# Patient Record
Sex: FEMALE | Race: WHITE | Employment: FULL TIME | URBAN - METROPOLITAN AREA
[De-identification: names, ages, dates, MRNs, and addresses within clinical notes are randomized per-mention and may not be internally consistent; named-entity substitution may affect disease eponyms.]

---

## 2017-06-28 ENCOUNTER — HOSPITAL ENCOUNTER (OUTPATIENT)
Dept: PHYSICAL THERAPY | Age: 62
Discharge: HOME | End: 2017-06-28
Payer: COMMERCIAL

## 2017-06-28 PROCEDURE — 97140 MANUAL THERAPY 1/> REGIONS: CPT

## 2017-06-28 PROCEDURE — 97110 THERAPEUTIC EXERCISES: CPT

## 2017-06-28 PROCEDURE — 97161 PT EVAL LOW COMPLEX 20 MIN: CPT

## 2017-06-28 NOTE — PROGRESS NOTES
[facility] PHYSICAL THERAPY  [address] [place] [address] 51, [facility] [[email or web address]] 201,[facility], 70 [facility] - Phone: [phone]  Fax: [phone] [facility] / [address] [facility]  Patient Name: Clem Mohan : 1955   Medical   Diagnosis: S/p B TKA Treatment Diagnosis: Bilateral knee pain [M25.561, M25.562]  Postoperative pain, acute, knee [M25.569, G89.18]   Onset Date: DOS: 17     Referral Source: Shiva Yuan MD [facility]): 2017   Prior Hospitalization: See medical history Provider #: [id number]   Prior Level of Function: Previously ambulated w/o AD   Comorbidities: depression   Medications: Verified on Patient Summary List   The Plan of Care and following information is based on the information from the initial evaluation.   ===========================================================================================  Assessment / key information:  Pt is a 64 y/o F who presents to PT w/ c/o B knee pain L>R s/p B TKA DOS 17. Pt reports 2 year hx of chronic knee pain that failed to improve w/ conservative measures. Pt reports receiving subacute rehab x 1 week f/b 1 week HHPT. Reports compliance to HEP since DC from PT. Pt c/o pain ranging 0-5/10 located primarily L medial/lateral knee and stiffness L>R knee. Pt notes difficulty ascending/descending stairs and rising to stand from low/soft surfaces. Pt denies red flags. Objective exam: Pt presents ambulating w/ FWW, widened GREY and dec heel strike/knee flexion L. R knee AROM 1-0-100, L knee AROM 3-0-85. PROM R knee flexion 103, L knee 91. Dec inf patella glide noted L. Fair quad set R, fair- L, w/ mild extensor lag noted to L SLR flex. Mild effusion noted to B knees w/ girth measurements as follows: mid pat R= 52 cm, L 51, 2\" above R = 54.4, L = 57, 4\" above R = 56, L = 59, 2\" below R = 45, L = 45, 6\" below R = 48, L = 46.5. TTP noted to L medial joint line.  (-) Chinyere's, (-) 90/90. Pt demo dec SLS ability B. FOTO score 33/100. Pt educated on dx, prognosis, POC, and HEP. Pt will benefit from PT to address problem list below to enable pt improved functional mobility  ===========================================================================================  Eval Complexity: History LOW Complexity : Zero comorbidities / personal factors that will impact the outcome / POC;  Examination  MEDIUM Complexity : 3 Standardized tests and measures addressing body structure, function, activity limitation and / or participation in recreation ; Presentation MEDIUM Complexity : Evolving with changing characteristics ; Decision Making MEDIUM Complexity : FOTO score of 26-74; Overall Complexity LOW   Problem List: pain affecting function, decrease ROM, decrease strength, edema affecting function, impaired gait/ balance, decrease ADL/ functional abilitiies, decrease activity tolerance, decrease flexibility/ joint mobility and decrease transfer abilities   Treatment Plan may include any combination of the following: Therapeutic exercise, Therapeutic activities, Neuromuscular re-education, Physical agent/modality, Gait/balance training, Manual therapy, Patient education, Self Care training, Functional mobility training, Home safety training and Stair training  Patient / Family readiness to learn indicated by: asking questions, trying to perform skills and interest  Persons(s) to be included in education: patient (P)  Barriers to Learning/Limitations: None  Measures taken:    Patient Goal (s): \"get back to walking\"   Patient self reported health status: good  Rehabilitation Potential: good   Short Term Goals: To be accomplished in  2  weeks:  1. Pt will be I and compliant with HEP for self management of sx  2. Improve B knee flexion AROM to at least 115 deg to improve ease of transfers and stair clearance   Long Term Goals: To be accomplished in  4  weeks:  1.  Pt will ambulate >500' w/o AD demonstrating symmetrical gait to allow pt return to PLOF  2. Pt will ascend/descend 4 steps w/ 1 HR, reciprocal pattern to A w/ household entry  3. Improve FOTO Score to >/= 56/100 to indicate improved function    Frequency / Duration:   Patient to be seen  3  times per week for 4  weeks:  Patient / Caregiver education and instruction: exercises  G-Codes (GP): na  Therapist Signature: Brett Hodges PT, DPT, CMTPT, [facility] Date: 9/66/3741   Certification Period: na Time: 10:43 AM   ===========================================================================================  I certify that the above Physical Therapy Services are being furnished while the patient is under my care. I agree with the treatment plan and certify that this therapy is necessary. Physician Signature:        Date:       Time:     Please sign and return to [facility] [place]. or you may fax the signed copy to [phone]. Thank you.

## 2017-06-28 NOTE — PROGRESS NOTES
PHYSICAL THERAPY - DAILY TREATMENT NOTE    Patient Name: Samira Hardy        Date: 2017  : 1955   yes Patient  Verified  Visit #:      12  Insurance: Payor: Carmen Severance / Plan: [employer] [facility] / Product Type: PPO /      In time: 9:52 Out time: 10:39   Total Treatment Time: 47     Medicare Time Tracking (below)   Total Timed Codes (min):  na 1:1 Treatment Time:  na     TREATMENT AREA =  Bilateral knee pain [M25.561, M25.562]  Postoperative pain, acute, knee [M25.569, G89.18]    SUBJECTIVE  Pain Level (on 0 to 10 scale):  0  / 10   Medication Changes/New allergies or changes in medical history, any new surgeries or procedures?    no  If yes, update Summary List   Subjective Functional Status/Changes:  []  No changes reported     See POC          OBJECTIVE    10 min Therapeutic Exercise:  [x]  See flow sheet   Rationale:      increase ROM and increase strength to improve the patients ability to ambulate, stand, transfer     8 min Manual Therapy: PROM to L knee flexion, inf patella mob   Rationale:      decrease pain and increase ROM to improve patient's ability to ambulate w/ symmetrical gait     min Patient Education:  yes  Reviewed HEP   []  Progressed/Changed HEP based on: Other Objective/Functional Measures:    Pt demo I w/ HEP     Post Treatment Pain Level (on 0 to 10) scale:   0  / 10     ASSESSMENT  Assessment/Changes in Function:     See POC     []  See Progress Note/Recertification   Patient will continue to benefit from skilled PT services to modify and progress therapeutic interventions, address functional mobility deficits, address ROM deficits, address strength deficits, analyze and address soft tissue restrictions, analyze and cue movement patterns, analyze and modify body mechanics/ergonomics, assess and modify postural abnormalities, address imbalance/dizziness and instruct in home and community integration to attain remaining goals.    Progress toward goals / Updated goals:    See POC     PLAN  []  Upgrade activities as tolerated yes Continue plan of care   []  Discharge due to :    []  Other:      Therapist: Rich Pandey PT    Date: 6/28/2017 Time: 10:40 AM     Future Appointments  Date Time Provider Ronaldo Cabrera   6/30/2017 8:30 AM Robert Figueroa, PTA [facility]   7/3/2017 11:00 AM Venia Major, PTA [facility]   7/5/2017 8:00 AM Robert Figueroa, PTA [facility]   7/10/2017 10:00 AM Robert Figueroa, PTA [facility]   7/12/2017 10:30 AM Robert Figueroa, [facility]   7/14/2017 8:30 AM Wayne Duran, PT [facility]   7/24/2017 6:00 PM Rich Pandey, PT [facility]   7/26/2017 5:30 PM Rich Pandey, PT [facility]   7/28/2017 3:30 PM Yadi Mckoy, PTA [facility]   7/31/2017 6:00 PM Rich Pandey, PT [facility]   8/2/2017 6:00 PM Rich Pandey, PT [facility]   8/4/2017 6:30 AM Robert Figueroa, PTA [facility]

## 2017-06-30 ENCOUNTER — HOSPITAL ENCOUNTER (OUTPATIENT)
Dept: PHYSICAL THERAPY | Age: 62
Discharge: HOME | End: 2017-06-30
Payer: COMMERCIAL

## 2017-06-30 PROCEDURE — 97140 MANUAL THERAPY 1/> REGIONS: CPT

## 2017-06-30 PROCEDURE — 97110 THERAPEUTIC EXERCISES: CPT

## 2017-06-30 NOTE — PROGRESS NOTES
PHYSICAL THERAPY - DAILY TREATMENT NOTE    Patient Name: Ian Hunter        Date: 2017  : 1955   YES Patient  Verified  Visit #:   2  of   12  Insurance: Payor: BLUE CROSS / Plan: [facility] [facility] / Product Type: PPO /      In time: 830 Out time: 920   Total Treatment Time: 50     Medicare Time Tracking (below)   Total Timed Codes (min):   1:1 Treatment Time:       TREATMENT AREA =   Bilateral knee pain [M25.561, M25.562]  Postoperative pain, acute, knee [M25.569, G89.18]    SUBJECTIVE  Pain Level (on 0 to 10 scale):  0  / 10   Medication Changes/New allergies or changes in medical history, any new surgeries or procedures? NO    If yes, update Summary List   Subjective Functional Status/Changes:  []  No changes reported     This has not been a very good morning. My left leg is really sore and achy          OBJECTIVE  Modalities Rationale:     decrease inflammation and decrease pain to improve patient's ability to perform functional mobility activities with decrease c/o symptoms.    min [] Estim, type/location:                                      []  att     []  unatt     []  w/US     []  w/ice    []  w/heat    min []  Mechanical Traction: type/lbs                   []  pro   []  sup   []  int   []  cont    []  before manual    []  after manual    min []  Ultrasound, settings/location:      min []  Iontophoresis w/ dexamethasone, location:                                               []  take home patch       []  in clinic    min []  Ice     []  Heat    location/position:     min []  Vasopneumatic Device, press/temp:     min []  Other:    [x] Skin assessment post-treatment (if applicable):    [x]  intact    [x]  redness- no adverse reaction     []redness - adverse reaction:        35 min Therapeutic Exercise:  [x]  See flow sheet   Rationale:      increase ROM and increase strength to improve the patients ability to perform functional mobility activities with decrease c/o symptoms. 15 min Manual Therapy: B knee PROM, patella mob, retrograde massage. Rationale:      decrease pain, increase ROM and increase tissue extensibility to improve patient's ability to perform functional mobility activities with decrease c/o symptoms. min Patient Education:  YES  Reviewed HEP   []  Progressed/Changed HEP based on: Other Objective/Functional Measures:    No change in functional measurements today. Patient declined ice and stated she would at home. Post Treatment Pain Level (on 0 to 10) scale:   0  / 10 \"sore\"     ASSESSMENT  Assessment/Changes in Function:     Overall good tolerance to all therapeutic interventions for first treatment session     []  See Progress Note/Recertification   Patient will continue to benefit from skilled PT services to modify and progress therapeutic interventions, address functional mobility deficits, address ROM deficits, address strength deficits, analyze and address soft tissue restrictions and analyze and cue movement patterns to attain remaining goals. Progress toward goals / Updated goals:    No change toward goals today.      PLAN  []  Upgrade activities as tolerated YES Continue plan of care   []  Discharge due to :    []  Other:      Therapist: ERIC Stern    Date: 6/30/2017 Time: 6:28 AM       Future Appointments  Date Time Provider Ronaldo Cabrera   6/30/2017 8:30 AM Moishe Bosworth, [facility]   7/3/2017 11:00 AM Wiliam Cheema, PTA [facility]   7/5/2017 8:00 AM Moishe Bosworth, [facility]   7/10/2017 10:00 AM Moishe Bosworth, [facility]   7/12/2017 10:30 AM Moishe Bosworth, [facility]   7/14/2017 8:30 AM Leon Duran, PT [facility]   7/24/2017 6:00 PM Dewart Night, PT [facility]   7/26/2017 5:30 PM Dewart Night, PT [facility]   7/28/2017 3:30 PM Wiliam Cheema, PTA [facility]   7/31/2017 6:00 PM Dewart Night, PT [facility]   8/2/2017 6:00 PM Dewart Night, PT [facility]   8/4/2017 6:30 AM Nilay Mina Gill Gunner [facility]

## 2017-07-03 ENCOUNTER — HOSPITAL ENCOUNTER (OUTPATIENT)
Dept: PHYSICAL THERAPY | Age: 62
Discharge: HOME | End: 2017-07-03
Payer: COMMERCIAL

## 2017-07-03 PROCEDURE — 97110 THERAPEUTIC EXERCISES: CPT

## 2017-07-03 PROCEDURE — 97140 MANUAL THERAPY 1/> REGIONS: CPT

## 2017-07-03 NOTE — PROGRESS NOTES
PHYSICAL THERAPY - DAILY TREATMENT NOTE    Patient Name: Shira Pepe        Date: 7/3/2017  : 1955   YES Patient  Verified  Visit #:   3   of   12  Insurance: Payor: George Wilson / Plan: [employer] [facility] / Product Type: PPO /      In time: 1055 Out time: 1145   Total Treatment Time: 50     Medicare Time Tracking (below)   Total Timed Codes (min):  50 1:1 Treatment Time:       TREATMENT AREA =  Bilateral knee pain [M25.561, M25.562]  Postoperative pain, acute, knee [M25.569, G89.18]    SUBJECTIVE  Pain Level (on 0 to 10 scale):  0  / 10   Medication Changes/New allergies or changes in medical history, any new surgeries or procedures? NO    If yes, update Summary List   Subjective Functional Status/Changes:  []  No changes reported     \"I was pretty sore after the last visit, all around both knees. But it felt good to move and exercise. \"  States she's taking progressively longer walks out of the house, unable to determine distance. OBJECTIVE  40 min Therapeutic Exercise:  [x]  See flow sheet   Rationale:      increase ROM, increase strength, improve coordination and improve balance to improve the patients ability to ambulate. 10 Min Manual Therapy: (B) knee PROM and patellar mobs. Rationale:      decrease pain, increase ROM, increase tissue extensibility and decrease trigger points to improve patient's ability to perform pain free ADLs. min Patient Education:  YES  Reviewed HEP   []  Progressed/Changed HEP based on: Other Objective/Functional Measures: Therex per flow sheet. Post Treatment Pain Level (on 0 to 10) scale:   0  / 10     ASSESSMENT  Assessment/Changes in Function:     Pt reporting decreased pain following exercise. States the knees feel really good. Instructed to continue with HEP exercises.       []  See Progress Note/Recertification   Patient will continue to benefit from skilled PT services to modify and progress therapeutic interventions, address functional mobility deficits, address ROM deficits, address strength deficits, analyze and address soft tissue restrictions, analyze and cue movement patterns, analyze and modify body mechanics/ergonomics and assess and modify postural abnormalities to attain remaining goals. Progress toward goals / Updated goals:    Continued strengthening to progress toward LTG #1 and 2.       PLAN  [x]  Upgrade activities as tolerated YES Continue plan of care   []  Discharge due to :    []  Other:      Therapist: Stephanie Alex PTA    Date: 7/3/2017 Time: 12:33 PM     Future Appointments  Date Time Provider Ronaldo Cabrera   7/5/2017 8:00 AM Rafael Camera, PTA [facility]   7/10/2017 10:00 AM [place] Camera, [facility]   7/12/2017 10:30 AM [place] Camera, [facility]   7/14/2017 8:30 AM [address] [facility], [facility]   7/24/2017 6:00 PM [address] [facility], PT [facility]   7/26/2017 5:30 PM [address] [facility], PT [facility]   7/28/2017 3:30 PM Stephanie Alex, [facility]   7/31/2017 6:00 PM [address] [facility], PT [facility]   8/2/2017 6:00 PM [address] [facility], [facility]   8/4/2017 6:30 AM [place] Camera, [facility]

## 2017-07-05 ENCOUNTER — HOSPITAL ENCOUNTER (OUTPATIENT)
Dept: PHYSICAL THERAPY | Age: 62
Discharge: HOME | End: 2017-07-05
Payer: COMMERCIAL

## 2017-07-05 PROCEDURE — 97110 THERAPEUTIC EXERCISES: CPT

## 2017-07-05 PROCEDURE — 97140 MANUAL THERAPY 1/> REGIONS: CPT

## 2017-07-05 NOTE — PROGRESS NOTES
PHYSICAL THERAPY - DAILY TREATMENT NOTE    Patient Name: Taz Kaplan        Date: 2017  : 1955   YES Patient  Verified  Visit #:      of   12  Insurance: Payor: BLUE CROSS / [facility] / Product Type: PPO /      In time: 800 Out time: 845   Total Treatment Time: 45     Medicare Time Tracking (below)   Total Timed Codes (min):   1:1 Treatment Time:       TREATMENT AREA =  Bilateral knee pain [M25.561, M25.562]  Postoperative pain, acute, knee [M25.569, G89.18]    SUBJECTIVE  Pain Level (on 0 to 10 scale):  1  / 10   Medication Changes/New allergies or changes in medical history, any new surgeries or procedures? NO    If yes, update Summary List   Subjective Functional Status/Changes:  []  No changes reported     Just stiff, no real pain,  Reports no problems over the weekend. I hurt after last session, but it wasn't that bad. OBJECTIVE    35 min Therapeutic Exercise:  [x]  See flow sheet   Rationale:      increase ROM and increase strength to improve the patients ability to perform functional mobility activities with decrease c/o symptoms.       10 min Manual Therapy: (B) knee PROM and patellar mobs. Rationale:      decrease pain, increase ROM and increase tissue extensibility to improve patient's ability to perform functional mobility activities with decrease c/o symptoms.        min Patient Education:  YES  Reviewed HEP   []  Progressed/Changed HEP based on: Other Objective/Functional Measures:    PROM: R 0 - 110, L 0 - 103. Post Treatment Pain Level (on 0 to 10) scale:   0-1  / 10     ASSESSMENT  Assessment/Changes in Function:     Reports increasing ambulation (500 feet) outside with use of RW and someone by her side. reprots decrease difficulty with sit<>stand transfers.      []  See Progress Note/Recertification   Patient will continue to benefit from skilled PT services to modify and progress therapeutic interventions, address functional mobility deficits, address ROM deficits, address strength deficits, analyze and address soft tissue restrictions and analyze and cue movement patterns to attain remaining goals. Progress toward goals / Updated goals:    Progressing steadily with ROM  STG 1 achieved.      PLAN  []  Upgrade activities as tolerated YES Continue plan of care   []  Discharge due to :    []  Other:      Therapist: ERIC David    Date: 7/5/2017 Time: 6:45 AM       Future Appointments  Date Time Provider Ronaldo Cabrera   7/5/2017 8:00 AM Guanako Becker, PTA [facility]   7/10/2017 10:00 AM Guanako Becker, PTA [facility]   7/12/2017 10:30 AM Guanako Becker, PTA [facility]   7/14/2017 8:30 AM Farnaz Bae, PT [facility]   7/24/2017 6:00 PM Farnaz Bae, PT [facility]   7/26/2017 5:30 PM Farnaz Bae, PT [facility]   7/28/2017 3:30 PM Chris Rubin, PTA [facility]   7/31/2017 6:00 PM Farnaz Bae, PT [facility]   8/2/2017 6:00 PM Farnaz Bae, PT [facility]   8/4/2017 6:30 AM Guanako Becker, [facility]

## 2017-07-10 ENCOUNTER — HOSPITAL ENCOUNTER (OUTPATIENT)
Dept: PHYSICAL THERAPY | Age: 62
Discharge: HOME | End: 2017-07-10
Payer: COMMERCIAL

## 2017-07-10 PROCEDURE — 97110 THERAPEUTIC EXERCISES: CPT

## 2017-07-10 PROCEDURE — 97140 MANUAL THERAPY 1/> REGIONS: CPT

## 2017-07-10 NOTE — PROGRESS NOTES
PHYSICAL THERAPY - DAILY TREATMENT NOTE    Patient Name: Lalito Oshea        Date: 7/10/2017  : 1955   YES Patient  Verified  Visit #:      of   12  Insurance: Payor: [place] [employer] / Plan: [employer] [facility] / Product Type: PPO /      In time: 955 Out time: 1100   Total Treatment Time: 65     Medicare Time Tracking (below)   Total Timed Codes (min):   1:1 Treatment Time:       TREATMENT AREA =  Bilateral knee pain [M25.561, M25.562]  Postoperative pain, acute, knee [M25.569, G89.18]    SUBJECTIVE  Pain Level (on 0 to 10 scale):  0  / 10 (B). Medication Changes/New allergies or changes in medical history, any new surgeries or procedures? NO    If yes, update Summary List   Subjective Functional Status/Changes:  []  No changes reported     Just really stiff and sore today,  May have overdone it a bit over the weekend, and with the exercises too. OBJECTIVE    50 min Therapeutic Exercise:  [x]  See flow sheet   Rationale:      increase ROM and increase strength to improve the patients ability to perform functional mobility activities with decrease c/o symptoms. 15 min Manual Therapy: (B) knee PROM and patellar mobs. Rationale:      decrease pain, increase ROM and increase tissue extensibility to improve patient's ability to perform functional mobility activities with decrease c/o symptoms. min Patient Education:  YES  Reviewed HEP   []  Progressed/Changed HEP based on: Other Objective/Functional Measures:    GROC: +6 a great deal better  FOTO 45    Add standing hip abd, marching, HR/TR 3# 15x.      Post Treatment Pain Level (on 0 to 10) scale:   0  / 10     ASSESSMENT  Assessment/Changes in Function:     Able to negotiate 4 steps with (B) handrail and reciprocal pattern.     []  See Progress Note/Recertification   Patient will continue to benefit from skilled PT services to modify and progress therapeutic interventions, address functional mobility deficits, address ROM deficits, address strength deficits, analyze and address soft tissue restrictions and analyze and cue movement patterns to attain remaining goals. Progress toward goals / Updated goals:    See PN.      PLAN  []  Upgrade activities as tolerated YES Continue plan of care   []  Discharge due to :    []  Other:      Therapist: ERIC Rosa    Date: 7/10/2017 Time: 6:47 AM       Future Appointments  Date Time Provider Ronaldo Cabrera   7/10/2017 10:00 AM Redmon Apgar, [place] [address]   2017 10:30 AM Redmon Apgar, PTA [place] [address]   2017 8:30 AM [address] [facility], [place] [address]   2017 6:00 PM [address] [place] [address], [place] [address]   2017 5:30 PM [address], [place] [address]   2017 3:30 PM Angi Rubio, [place] [address]   2017 6:00 PM [address], PT [place] [address]   2017 6:00 PM [address] [place] [address], [place] [address]   2017 6:30 AM Redmon Apgar, [place] [address]

## 2017-07-10 NOTE — PROGRESS NOTES
[facility] PHYSICAL THERAPY  [address] [place] [address] 51, [address],[facility], [address] [facility] - Phone: [phone]  Fax: [phone]   PROGRESS NOTE    Patient Name: Chidi Ryan : 1955   Treatment/Medical  Diagnosis: Bilateral knee pain [M25.561, M25.562]  Postoperative pain, acute, knee [M25.569, G89.18]     Referral Source: [facility], Not On File, MD     Date of Initial Visit: 2017 Attended Visits: 5 Missed Visits: 0     SUMMARY OF TREATMENT  Chidi Rayn has been seen at our clinic 2x/wk for a total of 5 visits. Pt treatment has consisted of aerobic warm-up with stationary bike, therapeutic exercise for knee ROM, LE strengthening, hip/core strengthening, and manual therapy (patella mobilization, AAROM, and hamstring stretch)    CURRENT STATUS  Pt has had a good tolerance to physical therapy treatment. Patient reports that she is able to perform all functional mobility activities with significantly less difficulty and less pain. GROC +6 a great deal better. PROM: (L) 0 - 103, (R) 0 - 110. Patient is able to negotiate 4 small hurdles in parallel bars with fair clearance. SL balance 3 x 15 seconds with finger touch on bars. Patient reports compliance with HEP. Goal/Measure of Progress Goal Met? 1. Pt will ambulate >500' w/o AD demonstrating symmetrical gait to allow pt return to PLOF   Status at last Eval:  Current Status: Reports increasing ambulation (500 feet) outside with use of RW and someone by her side. Progressing well   2. Pt will ascend/descend 4 steps w/ 1 HR, reciprocal pattern to A w/ household entry   Status at last Eval:  Current Status: Negotiates 4 steps with (B) handrail and reciprocal pattern Progressing well. 3.  Improve FOTO Score to >/= 56/100 to indicate improved function   Status at last Eval: 33 Current Status: 39 Progressing steadily: 12 point improvement     New Goals to be achieved in __4__  weeks:  1.   Continue with above goals. RECOMMENDATIONS  We would like to continue with treatment to progress further with goals. We would like to extend 2-3x/week for 4 weeks. Please advise. Thank you. If you have any questions/comments please contact us directly at [phone]. Thank you for allowing us to assist in the care of your patient. LPTA Signature: Gigi Luna PTA Date: 7/10/2017   PT Signature:  Time: 9:51 AM     NOTE TO PHYSICIAN:  [address] AND FAX TO   [facility] Physical Therapy: [phone]  If you are unable to process this request in 24 hours please contact our office: [phone]    ___ I have read the above report and request that my patient continue as recommended.   ___ I have read the above report and request that my patient continue therapy with the following changes/special instructions:_________________________________________________________   ___ I have read the above report and request that my patient be discharged from therapy.      Physician Signature:        Date:       Time:

## 2017-07-12 ENCOUNTER — HOSPITAL ENCOUNTER (OUTPATIENT)
Dept: PHYSICAL THERAPY | Age: 62
Discharge: HOME | End: 2017-07-12
Payer: COMMERCIAL

## 2017-07-12 PROCEDURE — 97110 THERAPEUTIC EXERCISES: CPT

## 2017-07-12 PROCEDURE — 97140 MANUAL THERAPY 1/> REGIONS: CPT

## 2017-07-12 NOTE — PROGRESS NOTES
PHYSICAL THERAPY - DAILY TREATMENT NOTE    Patient Name: Moises Garcia        Date: 2017  : 1955   YES Patient  Verified  Visit #:     Insurance: Payor: BLUE CROSS / [facility]: [facility] / Product Type: PPO /      In time: 978 Out time:    Total Treatment Time: 60     Medicare Time Tracking (below)   Total Timed Codes (min):   1:1 Treatment Time:       TREATMENT AREA =  Bilateral knee pain [M25.561, M25.562]  Postoperative pain, acute, knee [M25.569, G89.18]    SUBJECTIVE  Pain Level (on 0 to 10 scale):  1  / 10   Medication Changes/New allergies or changes in medical history, any new surgeries or procedures? NO    If yes, update Summary List   Subjective Functional Status/Changes:  []  No changes reported     They were really happy with how my knees are coming along. I did really good with the drive up there, my brother drove and the sitting wasn't to bad. I have started taking my pain medication at night now  I though I was being a wimp so I had stopped, but they told me to start taking it. So I did and last night I slept really good. OBJECTIVE    40 min Therapeutic Exercise:  [x]  See flow sheet   Rationale:      increase ROM and increase strength to improve the patients ability to perform functional mobility activities with decrease c/o symptoms. 20 min Manual Therapy: (B) knee PROM and patellar mobs. Rationale:      decrease pain, increase ROM and increase tissue extensibility to improve patient's ability to perform functional mobility activities with decrease c/o symptoms. min Patient Education:  YES  Reviewed HEP   []  Progressed/Changed HEP based on: Other Objective/Functional Measures:    Good mobility of (B) incision sites. Decrease restriction of movement with PROM.      Post Treatment Pain Level (on 0 to 10) scale:   1  / 10     ASSESSMENT  Assessment/Changes in Function:     Reports increase ease of walking and less stiffness. Improved weight bearing (B) with sit <> stand activity     []  See Progress Note/Recertification   Patient will continue to benefit from skilled PT services to modify and progress therapeutic interventions, address functional mobility deficits, address ROM deficits, address strength deficits, analyze and address soft tissue restrictions and analyze and cue movement patterns to attain remaining goals. Progress toward goals / Updated goals:    Progressing steadily with ROM of (B) knee and strength.      PLAN  []  Upgrade activities as tolerated YES Continue plan of care   []  Discharge due to :    []  Other:      Therapist: ERIC Blackburn    Date: 7/12/2017 Time: 8:51 AM       Future Appointments  Date Time Provider Ronaldo Cabrera   7/12/2017 10:30 AM Shell Garcia PTA [facility]   7/14/2017 8:30 AM Jonelle Bull, PT [facility]   7/24/2017 6:00 PM Jonelle Bull PT [facility]   7/26/2017 5:30 PM Jonelle Bull PT [facility]   7/28/2017 3:30 PM Kinza Roberts PTA [facility]   7/31/2017 6:00 PM Jonelle Bull PT [facility]   8/2/2017 6:00 PM Jonelle Bull PT [facility]   8/4/2017 6:30 AM Shell Garcia PTA [facility]

## 2017-07-14 ENCOUNTER — HOSPITAL ENCOUNTER (OUTPATIENT)
Dept: PHYSICAL THERAPY | Age: 62
Discharge: HOME | End: 2017-07-14
Payer: COMMERCIAL

## 2017-07-14 PROCEDURE — 97110 THERAPEUTIC EXERCISES: CPT

## 2017-07-14 PROCEDURE — 97140 MANUAL THERAPY 1/> REGIONS: CPT

## 2017-07-14 NOTE — PROGRESS NOTES
PHYSICAL THERAPY - DAILY TREATMENT NOTE    Patient Name: Pierre Jaimes        Date: 2017  : 1955   yes Patient  Verified  Visit #:     Insurance: Payor: BLUE CROSS / [facility] / Product Type: PPO /      In time: 8:30 Out time: 9:25   Total Treatment Time: 55     Medicare Time Tracking (below)   Total Timed Codes (min):  na 1:1 Treatment Time:  na     TREATMENT AREA =  Bilateral knee pain [M25.561, M25.562]  Postoperative pain, acute, knee [M25.569, G89.18]    SUBJECTIVE  Pain Level (on 0 to 10 scale):  0  / 10   Medication Changes/New allergies or changes in medical history, any new surgeries or procedures?    no  If yes, update Summary List   Subjective Functional Status/Changes:  []  No changes reported     Pt c/o stiffness B knees this AM, otherwise notes no pain           OBJECTIVE    40 min Therapeutic Exercise:  [x]  See flow sheet   Rationale:      increase ROM, increase strength, improve coordination and improve balance to improve the patients ability to ambulate, transfer     15 min Manual Therapy: CFM to B incision, PROM B knee flexion   Rationale:      decrease pain, increase ROM, increase tissue extensibility and decrease trigger points to improve patient's ability to ambulate     min Patient Education:  yes  Reviewed HEP   []  Progressed/Changed HEP based on: Other Objective/Functional Measures:    Ambulated 50' x 2 w/ SPC R  Negotiated 6 small hurdles outside of parallel bars w/ SPC, CG A cueing to reduce knee circumduction  Pt unable to perform SLR flex w/ 1# weight w/o extensor lag, therefore reduced to 0#  Added DL leg press to improve quad strength     Post Treatment Pain Level (on 0 to 10) scale:   0  / 10     ASSESSMENT  Assessment/Changes in Function:     Pt progressing well towards all goals; demo's proper sequencing and symmetrical gait ambulating w/ SPC.  Advised pt to continue to amb w/ FWW outside of PT and will clear to [facility]/ [facility] when appropriate     []  See Progress Note/Recertification   Patient will continue to benefit from skilled PT services to modify and progress therapeutic interventions, address functional mobility deficits, address ROM deficits, address strength deficits, analyze and address soft tissue restrictions, analyze and cue movement patterns, analyze and modify body mechanics/ergonomics, address imbalance/dizziness and instruct in home and community integration to attain remaining goals.    Progress toward goals / Updated goals:    Progressing to LTG #1 this visit     PLAN  []  Upgrade activities as tolerated yes Continue plan of care   []  Discharge due to :    []  Other:      Therapist: Kaila Fox PT    Date: 7/14/2017 Time: 8:29 AM     Future Appointments  Date Time Provider Ronaldo Cabrera   7/14/2017 8:30 AM Kaila Fox PT [facility]   7/24/2017 6:00 PM Kaila Fox, PT [facility]   7/26/2017 5:30 PM Kaila Fox, PT [facility]   7/28/2017 3:30 PM Jayden Alva [facility]   7/31/2017 6:00 PM Kaila Fox, PT [facility]   8/2/2017 6:00 PM Kaila Fox, PT [facility]   8/4/2017 6:30 AM Andrei Huang [facility]

## 2017-07-24 ENCOUNTER — HOSPITAL ENCOUNTER (OUTPATIENT)
Dept: PHYSICAL THERAPY | Age: 62
Discharge: HOME | End: 2017-07-24
Payer: COMMERCIAL

## 2017-07-24 PROCEDURE — 97140 MANUAL THERAPY 1/> REGIONS: CPT

## 2017-07-24 PROCEDURE — 97110 THERAPEUTIC EXERCISES: CPT

## 2017-07-24 NOTE — PROGRESS NOTES
PHYSICAL THERAPY - DAILY TREATMENT NOTE    Patient Name: Stacy Wright        Date: 2017  : 1955   yes Patient  Verified  Visit #:   8   of   20  Insurance: Payor: BLUE CROSS / Plan: [employer] [facility] / Product Type: PPO /      In time: 6:00 Out time: 6:43   Total Treatment Time: 43     Medicare Time Tracking (below)   Total Timed Codes (min):  na 1:1 Treatment Time:  na     TREATMENT AREA =  Bilateral knee pain [M25.561, M25.562]  Postoperative pain, acute, knee [M25.569, G89.18]    SUBJECTIVE  Pain Level (on 0 to 10 scale):  1  / 10   Medication Changes/New allergies or changes in medical history, any new surgeries or procedures?    no  If yes, update Summary List   Subjective Functional Status/Changes:  []  No changes reported     Pt reports she was returned to work today and was able to drive without difficulty. Reports she sat in a higher chair but was mindful to get up and walk around 2-3x/hour to strech. C/o stiffness B knees upon arrival to PT       OBJECTIVE    31 min Therapeutic Exercise:  [x]  See flow sheet   Rationale:      increase ROM, increase strength, improve coordination and improve balance to improve the patients ability to ambulate     12 min Manual Therapy: CFM to B incision, inf patella mob B, PROM knee flexion   Rationale:      decrease pain, increase ROM, increase tissue extensibility and decrease trigger points to improve patient's ability to ambulate, stand     min Patient Education:  yes  Reviewed HEP   []  Progressed/Changed HEP based on: Other Objective/Functional Measures:    Pt brought in [facility] that she purchased; adjusted for appropriate fit.  Pt demo proper sequencing, jerica, and stability/balance w/ SPC  Inc resistance DL LP this visit       Post Treatment Pain Level (on 0 to 10) scale:   0  / 10     ASSESSMENT  Assessment/Changes in Function:     Cleared pt to amb w/ SPC for short community distances w/ SPC; advised to cont to utilize FWW if she is feeling unsteady, pain, or for instances where she may be standing/walking > 1 hour. []  See Progress Note/Recertification   Patient will continue to benefit from skilled PT services to modify and progress therapeutic interventions, address functional mobility deficits, address ROM deficits, address strength deficits, analyze and address soft tissue restrictions, analyze and cue movement patterns, address imbalance/dizziness and instruct in home and community integration to attain remaining goals.    Progress toward goals / Updated goals:    Progressing to LTG #2, negotiated 4 small steps w/ 1 HR w/ NR pattern     PLAN  []  Upgrade activities as tolerated yes Continue plan of care   []  Discharge due to :    []  Other:      Therapist: Burnell Cowden, PT    Date: 7/24/2017 Time: 2:30 PM     Future Appointments  Date Time Provider Ronaldo Cabrera   7/24/2017 6:00 PM Burnell Cowden, PT [facility]   7/26/2017 5:30 PM [address] [facility]   7/31/2017 6:00 PM [address] [facility]   8/2/2017 6:00 PM [address] [facility]   8/7/2017 5:00 PM Lozano Never, PTA [facility]   8/9/2017 5:00 PM Lozano Never, PTA [facility]   8/11/2017 8:30 AM Burnell Cowden, PT [facility]   8/14/2017 5:00 PM Lozano Never, PTA [facility]   8/16/2017 5:00 PM Lozano Never, PTA [facility]   8/18/2017 8:30 AM Burnell Cowden, PT [facility]   8/21/2017 5:00 PM Lozano Never, PTA [facility]   8/23/2017 5:00 PM Lozano Never, [facility]   8/25/2017 8:30 AM Burnell Cowden, PT [facility]   8/28/2017 5:00 PM Lozano Never, [facility]   8/30/2017 5:00 PM Lozano Never, PTA [facility]   9/1/2017 8:30 AM Gigi Luna, OCTAVIA [facility]

## 2017-07-26 ENCOUNTER — HOSPITAL ENCOUNTER (OUTPATIENT)
Dept: PHYSICAL THERAPY | Age: 62
Discharge: HOME | End: 2017-07-26
Payer: COMMERCIAL

## 2017-07-26 PROCEDURE — 97140 MANUAL THERAPY 1/> REGIONS: CPT

## 2017-07-26 PROCEDURE — 97110 THERAPEUTIC EXERCISES: CPT

## 2017-07-26 NOTE — PROGRESS NOTES
PHYSICAL THERAPY - DAILY TREATMENT NOTE    Patient Name: Bandar Godoy        Date: 2017  : 1955   yes Patient  Verified  Visit #:     Insurance: Payor: BLUE CROSS / Plan: [employer] [facility] / Product Type: PPO /      In time: 5:05 Out time: 5:50   Total Treatment Time: 45     Medicare Time Tracking (below)   Total Timed Codes (min):  na 1:1 Treatment Time:  na     TREATMENT AREA =  Bilateral knee pain [M25.561, M25.562]  Postoperative pain, acute, knee [M25.569, G89.18]    SUBJECTIVE  Pain Level (on 0 to 10 scale):  1  / 10   Medication Changes/New allergies or changes in medical history, any new surgeries or procedures?    no  If yes, update Summary List   Subjective Functional Status/Changes:  []  No changes reported     Pt reports she may have over-done it going back to work on Monday and working full days. Reports her legs feel tired and she noticed inc swelling in her ankles today. Reports she elevated her legs and swelling dec. OBJECTIVE    35 min Therapeutic Exercise:  [x]  See flow sheet   Rationale:      increase ROM and increase strength to improve the patients ability to ambulate, stand, transfer     10 min Manual Therapy: CFM to B incision, inf patella mob, PROM knee flexion   Rationale:      decrease pain, increase ROM, increase tissue extensibility and decrease trigger points to improve patient's ability to ambulate, stand     min Patient Education:  yes  Reviewed HEP   []  Progressed/Changed HEP based on:        Other Objective/Functional Measures:    Dec knee flexion PROM noted this visit 2' stiffness  Added supine bridges to improve glute and core strength     Post Treatment Pain Level (on 0 to 10) scale:   1  / 10     ASSESSMENT  Assessment/Changes in Function:     Pt requiring inc effort to complete therex this visit, though denied inc in pain     []  See Progress Note/Recertification   Patient will continue to benefit from skilled PT services to modify and progress therapeutic interventions, address functional mobility deficits, address ROM deficits, address strength deficits, analyze and address soft tissue restrictions, analyze and cue movement patterns, analyze and modify body mechanics/ergonomics, assess and modify postural abnormalities, address imbalance/dizziness and instruct in home and community integration to attain remaining goals.    Progress toward goals / Updated goals:    Progressing steadily to LTG #1, ambulating w/ SPC for short community distances     PLAN  []  Upgrade activities as tolerated yes Continue plan of care   []  Discharge due to :    []  Other:      Therapist: Alicia Amador PT    Date: 7/26/2017 Time: 5:35 PM     Future Appointments  Date Time Provider Ronaldo Cabrera   7/31/2017 6:00 PM Alicia Amador, PT [facility]   8/2/2017 6:00 PM Alicia Amador, PT [facility]   8/7/2017 5:00 PM Yoly Plata, PTA [facility]   8/9/2017 5:00 PM Yoly Plata PTA [facility]   8/11/2017 8:30 AM Alicia Amador, PT [facility]   8/14/2017 5:00 PM Yoly Plata, [facility]   8/16/2017 5:00 PM Yoly Plata, [facility]   8/18/2017 8:30 AM Alicia Amador, PT [facility]   8/21/2017 5:00 PM Yoly Plata, PTA [facility]   8/23/2017 5:00 PM Yoly Plata, PTA [facility]   8/25/2017 8:30 AM Alicia Amador, PT [facility]   8/28/2017 5:00 PM Yoly Plata [facility]   8/30/2017 5:00 PM Yoly Plata, [facility]   9/1/2017 8:30 AM Priscilla Eddy, [facility]

## 2017-07-28 ENCOUNTER — APPOINTMENT (OUTPATIENT)
Dept: PHYSICAL THERAPY | Age: 62
End: 2017-07-28
Payer: COMMERCIAL

## 2017-07-31 ENCOUNTER — HOSPITAL ENCOUNTER (OUTPATIENT)
Dept: PHYSICAL THERAPY | Age: 62
Discharge: HOME | End: 2017-07-31
Payer: COMMERCIAL

## 2017-07-31 PROCEDURE — 97140 MANUAL THERAPY 1/> REGIONS: CPT

## 2017-07-31 PROCEDURE — 97110 THERAPEUTIC EXERCISES: CPT

## 2017-07-31 NOTE — PROGRESS NOTES
PHYSICAL THERAPY - DAILY TREATMENT NOTE    Patient Name: Maryuri Manzo        Date: 2017  : 1955   yes Patient  Verified  Visit #:   10   of   20  Insurance: Payor: Jeni Jauregui / Plan: [employer] [facility] / Product Type: PPO /      In time: 5:55 Out time: 6:45   Total Treatment Time: 50     Medicare Time Tracking (below)   Total Timed Codes (min):  na 1:1 Treatment Time:  na     TREATMENT AREA =  Bilateral knee pain [M25.561, M25.562]  Postoperative pain, acute, knee [M25.569, G89.18]    SUBJECTIVE  Pain Level (on 0 to 10 scale):  2  / 10   Medication Changes/New allergies or changes in medical history, any new surgeries or procedures?    no  If yes, update Summary List   Subjective Functional Status/Changes:  []  No changes reported     Pt reports inc soreness today but is unsure what. Reports she sat too long on Thursday at work, causing inc swelling and pain to B knees. Reports she took off of work on Friday and rested/iced her legs. Reports current pain may be residual from that. Pt reports she got up every 30 minutes and walked around at work today, and has been elevated her legs at work as well         OBJECTIVE  35 min Therapeutic Exercise:  [x]  See flow sheet   Rationale:      increase ROM and increase strength to improve the patients ability to ambulate, stand, transfer     15 min Manual Therapy: CFM to B incision, inf patella mob, retro mass to R knee, PROM B knee flexion   Rationale:      decrease pain, increase ROM, increase tissue extensibility and decrease trigger points to improve patient's ability to complete ADLs     min Patient Education:  yes  Reviewed HEP   []  Progressed/Changed HEP based on:        Other Objective/Functional Measures:    Pt demo improved balance NR pattern small aye negotiation w/ SPC  Noted inc edema to R superolateral knee  Improved scar mobility noted B     Post Treatment Pain Level (on 0 to 10) scale:   0  / 10     ASSESSMENT  Assessment/Changes in Function:     Pt reported dec pain post tx session. Cont to demo guarding to L knee flexion PROM     []  See Progress Note/Recertification   Patient will continue to benefit from skilled PT services to modify and progress therapeutic interventions, address functional mobility deficits, address ROM deficits, address strength deficits, analyze and address soft tissue restrictions, analyze and cue movement patterns, analyze and modify body mechanics/ergonomics, address imbalance/dizziness and instruct in home and community integration to attain remaining goals.    Progress toward goals / Updated goals:    Met LTG #1, pt using SPC for all distances     PLAN  []  Upgrade activities as tolerated yes Continue plan of care   []  Discharge due to :    []  Other:      Therapist: Brett Hodges PT    Date: 7/31/2017 Time: 6:14 PM     Future Appointments  Date Time Provider Ronaldo Cabrera   8/2/2017 6:00 PM Brett Hodges PT [facility]   8/7/2017 5:00 PM Shell Nielson, PTA [facility]   8/9/2017 5:00 PM Shell Nielson, PTA [facility]   8/11/2017 8:30 AM Brett Hodges, PT [facility]   8/14/2017 5:00 PM Shell Nielson, [facility]   8/16/2017 5:00 PM Shell Nielson, PTA [facility]   8/18/2017 8:30 AM Brett Hodges PT [facility]   8/21/2017 5:00 PM Shell Nielson, PTA [facility]   8/23/2017 5:00 PM Shell Nielson, PTA [facility]   8/25/2017 8:30 AM Brett Hodges, PT [facility]   8/28/2017 5:00 PM Shell Nielson, [facility]   8/30/2017 5:00 PM Shell Nielson, PTA [facility]   9/1/2017 8:30 AM Kash Hewitt, [facility]

## 2017-08-02 ENCOUNTER — HOSPITAL ENCOUNTER (OUTPATIENT)
Dept: PHYSICAL THERAPY | Age: 62
Discharge: HOME | End: 2017-08-02
Payer: COMMERCIAL

## 2017-08-02 PROCEDURE — 97140 MANUAL THERAPY 1/> REGIONS: CPT

## 2017-08-02 PROCEDURE — 97110 THERAPEUTIC EXERCISES: CPT

## 2017-08-02 NOTE — PROGRESS NOTES
PHYSICAL THERAPY - DAILY TREATMENT NOTE    Patient Name: Taz Kaplan        Date: 2017  : 1955   yes Patient  Verified  Visit #:      20  Insurance: Payor: BLUE CROSS / Plan: [employer] [facility] [place] / Product Type: PPO /      In time: 5:55 Out time: 6:44   Total Treatment Time: 49     Medicare Time Tracking (below)   Total Timed Codes (min):  na 1:1 Treatment Time:  na     TREATMENT AREA =  Bilateral knee pain [M25.561, M25.562]  Postoperative pain, acute, knee [M25.569, G89.18]    SUBJECTIVE  Pain Level (on 0 to 10 scale):  1  / 10   Medication Changes/New allergies or changes in medical history, any new surgeries or procedures?    no  If yes, update Summary List   Subjective Functional Status/Changes:  []  No changes reported     Pt reports her ankles were swollen today at work. Reports she took a pain pill prior to PT            OBJECTIVE  37 min Therapeutic Exercise:  [x]  See flow sheet   Rationale:      increase ROM, increase strength and improve balance to improve the patients ability to ambulate prolonged distances     12 min Manual Therapy: cfm to B incision, inf patella mob, PROM knee flexion, STM to R RF distally   Rationale:      decrease pain, increase ROM, increase tissue extensibility and decrease trigger points to improve patient's ability to ambulate, negotiate stairs     min Patient Education:  yes  Reviewed HEP   []  Progressed/Changed HEP based on: Other Objective/Functional Measures:    Pt demo ability to complete SLR flex x 15 B w/o lag  Plan to add tap downs at NV to improve eccentric quad strength     Post Treatment Pain Level (on 0 to 10) scale:   0  / 10     ASSESSMENT  Assessment/Changes in Function:     Pt cont to make steady progress towards goals.  Reassess FOTO/GROC at NV     []  See Progress Note/Recertification   Patient will continue to benefit from skilled PT services to modify and progress therapeutic interventions, address functional mobility deficits, address ROM deficits, address strength deficits, analyze and address soft tissue restrictions, analyze and cue movement patterns and instruct in home and community integration to attain remaining goals.    Progress toward goals / Updated goals:    Progressing to LTG #2     PLAN  []  Upgrade activities as tolerated yes Continue plan of care   []  Discharge due to :    []  Other:      Therapist: Kaila Fox PT    Date: 8/2/2017 Time: 6:46 PM     Future Appointments  Date Time Provider Ronaldo Cabrera   8/7/2017 5:00 PM Chemo Titus, PTA [facility]   8/9/2017 5:00 PM Chemo Titus, PTA [facility]   8/11/2017 8:30 AM Kaila Fox, PT [facility]   8/14/2017 5:00 PM Chemo Titus, PTA [facility]   8/16/2017 5:00 PM Chemo Titus, PTA [facility]   8/18/2017 8:30 AM Kaila Fox, PT [facility]   8/21/2017 5:00 PM Chemo Titus, PTA [facility]   8/23/2017 5:00 PM Chemo Titus, PTA [facility]   8/25/2017 8:30 AM Kaila Fox, PT [facility]   8/28/2017 5:00 PM Chemo Titus, PTA [facility]   8/30/2017 5:00 PM Chemo Titus, PTA [facility]   9/1/2017 8:30 AM Claribel Benavides, [facility]

## 2017-08-04 ENCOUNTER — APPOINTMENT (OUTPATIENT)
Dept: PHYSICAL THERAPY | Age: 62
End: 2017-08-04
Payer: COMMERCIAL

## 2017-08-07 ENCOUNTER — HOSPITAL ENCOUNTER (OUTPATIENT)
Dept: PHYSICAL THERAPY | Age: 62
Discharge: HOME | End: 2017-08-07
Payer: COMMERCIAL

## 2017-08-07 PROCEDURE — 97140 MANUAL THERAPY 1/> REGIONS: CPT

## 2017-08-07 PROCEDURE — 97110 THERAPEUTIC EXERCISES: CPT

## 2017-08-07 NOTE — PROGRESS NOTES
PHYSICAL THERAPY - DAILY TREATMENT NOTE    Patient Name: Marian Monge        Date: 2017  : 1955   YES Patient  Verified  Visit #:     Insurance: Payor: BLUE CROSS / Plan: [employer] [facility] / Product Type: PPO /      In time: 505 Out time: 555   Total Treatment Time: 50     Medicare Time Tracking (below)   Total Timed Codes (min):  50 1:1 Treatment Time:       TREATMENT AREA =  Bilateral knee pain [M25.561, M25.562]  Postoperative pain, acute, knee [M25.569, G89.18]    SUBJECTIVE  Pain Level (on 0 to 10 scale):  2  / 10   Medication Changes/New allergies or changes in medical history, any new surgeries or procedures? NO    If yes, update Summary List   Subjective Functional Status/Changes:  []  No changes reported     Pt states she had her ankles looked at by the MD          OBJECTIVE  35 min Therapeutic Exercise:  [x]  See flow sheet   Rationale:      increase ROM, increase strength, improve coordination and improve balance to improve the patients ability to perform pain free ADLs. 15 Min Manual Therapy: (B) knees - scar massage, patellar mobs. Knee flexion PROM. STM/DTM distal quad. Rationale:      decrease pain, increase ROM, increase tissue extensibility and decrease trigger points to improve patient's ability to perform pain free ADLs. min Patient Education:  YES  Reviewed HEP   []  Progressed/Changed HEP based on: Other Objective/Functional Measures: Therex per flow sheet. FOTO = 51  GROC = +4      Post Treatment Pain Level (on 0 to 10) scale:   0  / 10     ASSESSMENT  Assessment/Changes in Function:     Continued improvement in FOTO score. Knee extension PROM WNL (B), pt compliant with HEP.       []  See Progress Note/Recertification   Patient will continue to benefit from skilled PT services to modify and progress therapeutic interventions, address functional mobility deficits, address ROM deficits, address strength deficits, analyze and address soft tissue restrictions, analyze and cue movement patterns, analyze and modify body mechanics/ergonomics and assess and modify postural abnormalities to attain remaining goals. Progress toward goals / Updated goals:    Progressing toward LTG #3.       PLAN  [x]  Upgrade activities as tolerated YES Continue plan of care   []  Discharge due to :    []  Other:      Therapist: Isidoro Dye PTA    Date: 8/7/2017 Time: 5:29 PM     Future Appointments  Date Time Provider Ronaldo Cabrera   8/9/2017 5:00 PM Isidoro Dye, PTA [facility]   8/11/2017 8:30 AM [address] [place] [address], PT [facility]   8/14/2017 5:00 PM Isidoro Dye, PTA [facility]   8/16/2017 5:00 PM Isidoro Dye, PTA [facility]   8/18/2017 8:30 AM [address] [place] [address], PT [facility]   8/21/2017 5:00 PM Isidoro Dye, [facility]   8/23/2017 5:00 PM Isidorochiara Dye, PTA [facility]   8/25/2017 8:30 AM [address] [place] [address], PT [facility]   8/28/2017 5:00 PM Isidorochiara Dye, [facility]   8/30/2017 5:00 PM Isidorochiara Dye, [facility]   9/1/2017 8:30 AM Eduardo Solorzano, [facility]

## 2017-08-09 ENCOUNTER — HOSPITAL ENCOUNTER (OUTPATIENT)
Dept: PHYSICAL THERAPY | Age: 62
Discharge: HOME | End: 2017-08-09
Payer: COMMERCIAL

## 2017-08-09 PROCEDURE — 97110 THERAPEUTIC EXERCISES: CPT

## 2017-08-09 PROCEDURE — 97140 MANUAL THERAPY 1/> REGIONS: CPT

## 2017-08-09 NOTE — PROGRESS NOTES
PHYSICAL THERAPY - DAILY TREATMENT NOTE    Patient Name: Daniel Hatch        Date: 2017  : 1955   YES Patient  Verified  Visit #:   15   of   20  Insurance: Payor: BLUE CROSS / Plan: [facility] [employer] / Product Type: PPO /      In time: 5 Out time: 6   Total Treatment Time: 60     Medicare Time Tracking (below)   Total Timed Codes (min):  50 1:1 Treatment Time:       TREATMENT AREA =  Bilateral knee pain [M25.561, M25.562]  Postoperative pain, acute, knee [M25.569, G89.18]    SUBJECTIVE  Pain Level (on 0 to 10 scale):  4  / 10   Medication Changes/New allergies or changes in medical history, any new surgeries or procedures? NO    If yes, update Summary List   Subjective Functional Status/Changes:  []  No changes reported     Pt reporting less pain in the (R) quad today. OBJECTIVE  Modalities Rationale:     decrease inflammation and decrease pain to improve patient's ability to perform pain free ADLs. min [] Estim, type/location:                                      []  att     []  unatt     []  w/US     []  w/ice    []  w/heat    min []  Mechanical Traction: type/lbs                   []  pro   []  sup   []  int   []  cont    []  before manual    []  after manual    min []  Ultrasound, settings/location:      min []  Iontophoresis w/ dexamethasone, location:                                               []  take home patch       []  in clinic   10 min [x]  Ice     []  Heat    location/position: Supine, (B) knees. min []  Vasopneumatic Device, press/temp:     min []  Other:    [x] Skin assessment post-treatment (if applicable):    [x]  intact    []  redness- no adverse reaction     []redness  adverse reaction:        35 min Therapeutic Exercise:  [x]  See flow sheet   Rationale:      increase ROM, increase strength, improve coordination and improve balance to improve the patients ability to perform pain free ADLs. 15 min Manual Therapy: STM/DTM (R) quad.   (B) knee PROM.    Rationale:      decrease pain, increase ROM, increase tissue extensibility and decrease trigger points to improve patient's ability to perform pain free ADLs. min Patient Education:  YES  Reviewed HEP   []  Progressed/Changed HEP based on: Other Objective/Functional Measures: Therex per flow sheet. Post Treatment Pain Level (on 0 to 10) scale:   0   / 10     ASSESSMENT  Assessment/Changes in Function:     No exacerbation of symptoms with today's session. []  See Progress Note/Recertification   Patient will continue to benefit from skilled PT services to modify and progress therapeutic interventions, address functional mobility deficits, address ROM deficits, address strength deficits, analyze and address soft tissue restrictions, analyze and cue movement patterns, analyze and modify body mechanics/ergonomics and assess and modify postural abnormalities to attain remaining goals. Progress toward goals / Updated goals:    No change in progress toward LTG's with today's session.       PLAN  [x]  Upgrade activities as tolerated YES Continue plan of care   []  Discharge due to :    []  Other:      Therapist: Valentin Daley PTA    Date: 8/9/2017 Time: 6:18 PM     Future Appointments  Date Time Provider Ronaldo Cabrera   8/11/2017 8:30 AM Tony Willson PT [facility]   8/14/2017 5:00 PM Valentin Daley PTA [facility]   8/16/2017 5:00 PM Valentin Daley PTA [facility]   8/18/2017 8:30 AM Tony Willson PT [facility]   8/21/2017 5:00 PM Valentin Daley PTA [facility]   8/23/2017 5:00 PM Valentin Daley PTA [facility]   8/25/2017 8:30 AM Tony Willson PT [facility]   8/28/2017 5:00 PM Valentin Daley PTA [facility]   8/30/2017 5:00 PM Valentin Daley PTA [facility]   9/1/2017 8:30 AM Delmar Nurse, [facility]

## 2017-08-11 ENCOUNTER — HOSPITAL ENCOUNTER (OUTPATIENT)
Dept: PHYSICAL THERAPY | Age: 62
Discharge: HOME | End: 2017-08-11
Payer: COMMERCIAL

## 2017-08-11 PROCEDURE — 97140 MANUAL THERAPY 1/> REGIONS: CPT

## 2017-08-11 PROCEDURE — 97110 THERAPEUTIC EXERCISES: CPT

## 2017-08-11 NOTE — PROGRESS NOTES
[facility] PHYSICAL THERAPY  [address] [place] [address] [address], [address],[facility], [address] [facility] - Phone: [phone]  Fax: [phone]  PROGRESS NOTE  Patient Name: Mago Alan : 1955   Treatment/Medical Diagnosis: Bilateral knee pain [M25.561, M25.562]  Postoperative pain, acute, knee [M25.569, G89.18]   Referral Source: Monica Sotelo MD     Date of Initial Visit: 17 Attended Visits: 14 Missed Visits: 0     SUMMARY OF TREATMENT  Pt has attended 14 sessions of PT s/p B TKA (DOS 17). Tx has included therex to improve LE strength, stability, ROM, balance, and gait training, as well as manual tx and pt education. CURRENT STATUS  Pt has made steady progress towards goals, reporting 80% overall improvement in sx, 4/10 max pain, 0/10 avg pain. Pt has recently returned to work, which has caused inc in stiffness, swelling, and soreness. Pt ambulating w/ SPC for community distances, no AD for short/in home distances. AROM: R 4-0-120; L 4-0-112. Milly Harriet MMT: quad R 4+/5, L 4/5, HS R 5/5, L 4+/5. Pt demo mild extensor lag L w/ SLR and no lag on the R. Negotiates 15 steps w/ reciprocal pattern, 1 HR and SPC to ascend and descend; pt shows dec eccentric quad control L>R. FOTO 51/100, GROC +4.    Goal/Measure of Progress Goal Met? 1. Pt will ambulate >500' w/o AD demonstrating symmetrical gait to allow pt return to PLOF   Status at last Eval: Ambulate outside w/ FWW Current Status: Ambulating all distances w/ SPC yes   2. Pt will ascend/descend 4 steps w/ 1 HR, reciprocal pattern to A w/ household entry   Status at last Eval: 4 steps w/ B HR and reciprocal pattern Current Status: 15 steps w/ 1 HR and SPC, reciprocal pattern yes   3. Improve FOTO score to >/= 56/100 to indicate improved function   Status at last Eval: 33/100 Current Status: 51/100 progress     New Goals to be achieved in __3-4__  weeks:  1.  Pt will improve B quad strength to 5/5 to improve stability w/ descending stairs reciprocally  2. Pt will demo I w/ long-term HEP in prep for DC  3. Achieve LTG #3    RECOMMENDATIONS  Recommend pt continue PT 2x/wk x 3-4 weeks to further improve upon B LE strength/stability to improve functional mobility. Thank you. If you have any questions/comments please contact us directly at [phone]. Thank you for allowing us to assist in the care of your patient. Therapist Signature: Diony Munroe PT, DPT, CMTPT, [facility] Date: 8/11/2017     Time: 8:42 AM   NOTE TO PHYSICIAN:  PLEASE COMPLETE THE ORDERS BELOW AND FAX TO   [facility] Physical [facility]: [phone]  If you are unable to process this request in 24 hours please contact our office: [phone]    ___ I have read the above report and request that my patient continue as recommended.   ___ I have read the above report and request that my patient continue therapy with the following changes/special instructions:_________________________________________________________   ___ I have read the above report and request that my patient be discharged from therapy.      Physician Signature:        Date:       Time:

## 2017-08-11 NOTE — PROGRESS NOTES
PHYSICAL THERAPY - DAILY TREATMENT NOTE    Patient Name: Sheryl Downey        Date: 2017  : 1955   yes Patient  Verified  Visit #:   15   of   20  Insurance: Payor: BLUE CROSS / Plan: [employer] [facility] / Product Type: PPO /      In time: 8:30 Out time: 9:29   Total Treatment Time: 59     Medicare Time Tracking (below)   Total Timed Codes (min):  na 1:1 Treatment Time:  na     TREATMENT AREA =  Bilateral knee pain [M25.561, M25.562]  Postoperative pain, acute, knee [M25.569, G89.18]    SUBJECTIVE  Pain Level (on 0 to 10 scale):  0  / 10   Medication Changes/New allergies or changes in medical history, any new surgeries or procedures?    no  If yes, update Summary List   Subjective Functional Status/Changes:  []  No changes reported     Pt reports 80% overall improvement in sx, 4/10 max pain, 0/10 avg pain.            OBJECTIVE  Modalities Rationale:     decrease inflammation and decrease pain to improve patient's ability to ambulate   min [] Estim, type/location:                                      []  att     []  unatt     []  w/US     []  w/ice    []  w/heat    min []  Mechanical Traction: type/lbs                   []  pro   []  sup   []  int   []  cont    []  before manual    []  after manual    min []  Ultrasound, settings/location:      min []  Iontophoresis w/ dexamethasone, location:                                               []  take home patch       []  in clinic   10 min [x]  Ice     []  Heat    location/position: To B knees in supine w/ bolster    min []  Vasopneumatic Device, press/temp:     min []  Other:    [] Skin assessment post-treatment (if applicable):    []  intact    []  redness- no adverse reaction     []redness  adverse reaction:        37 min Therapeutic Exercise:  [x]  See flow sheet   Rationale:      increase ROM, increase strength and improve balance to improve the patients ability to ambulate     12 min Manual Therapy: CFM to B scar, inf patella mob, PROM knee flexion, DTM to RF   Rationale:      decrease pain, increase ROM, increase tissue extensibility and decrease trigger points to improve patient's ability to ambulate     min Patient Education:  yes  Reviewed HEP   []  Progressed/Changed HEP based on: Other Objective/Functional Measures:    AROM R 4-0-120, L 4-0-112  MMT quad R 4+/5, L 4/5, HS R 5/5, L 4+/5  15 steps w/ 1 HR and SPC, reciprocal pattern     Post Treatment Pain Level (on 0 to 10) scale:   0  / 10     ASSESSMENT  Assessment/Changes in Function:     See PN     [x]  See Progress Note/Recertification   Patient will continue to benefit from skilled PT services to modify and progress therapeutic interventions, address functional mobility deficits, address ROM deficits, address strength deficits, analyze and address soft tissue restrictions, analyze and cue movement patterns and instruct in home and community integration to attain remaining goals.    Progress toward goals / Updated goals:    See PN     PLAN  []  Upgrade activities as tolerated yes Continue plan of care   []  Discharge due to :    []  Other:      Therapist: Desirae Hankins PT    Date: 8/11/2017 Time: 8:29 AM     Future Appointments  Date Time Provider Ronaldo Cabrera   8/11/2017 8:30 AM Desirae Hankins PT [facility]   8/14/2017 5:00 PM Trent Hyatt [facility]   8/16/2017 5:00 PM Trent Hyatt [facility]   8/18/2017 8:30 AM Desirae Hankins PT [facility]   8/21/2017 5:00 PM Trent Hyatt [facility]   8/23/2017 5:00 PM rTent Hyatt [facility]   8/25/2017 8:30 AM Desirae Hankins PT [facility]   8/28/2017 5:00 PM Trent Hyatt [facility]   8/30/2017 5:00 PM Trent Hyatt [facility]   9/1/2017 8:30 AM Dariel Wolfe [facility]

## 2017-08-14 ENCOUNTER — HOSPITAL ENCOUNTER (OUTPATIENT)
Dept: PHYSICAL THERAPY | Age: 62
Discharge: HOME | End: 2017-08-14
Payer: COMMERCIAL

## 2017-08-14 PROCEDURE — 97140 MANUAL THERAPY 1/> REGIONS: CPT

## 2017-08-14 PROCEDURE — 97110 THERAPEUTIC EXERCISES: CPT

## 2017-08-14 NOTE — PROGRESS NOTES
PHYSICAL THERAPY - DAILY TREATMENT NOTE    Patient Name: Tsering Hansen        Date: 2017  : 1955   YES Patient  Verified  Visit #:     Insurance: Payor: BLUE CROSS / Plan: [employer] [facility] [place] / Product Type: PPO /      In time: 505 Out time: 610   Total Treatment Time: 65     Medicare Time Tracking (below)   Total Timed Codes (min):  55 1:1 Treatment Time:       TREATMENT AREA =  Bilateral knee pain [M25.561, M25.562]  Postoperative pain, acute, knee [M25.569, G89.18]    SUBJECTIVE  Pain Level (on 0 to 10 scale):  1  / 10   Medication Changes/New allergies or changes in medical history, any new surgeries or procedures? NO    If yes, update Summary List   Subjective Functional Status/Changes:  []  No changes reported     Pt reporting more soreness today than usual.  \"[place]. \"        OBJECTIVE  Modalities Rationale:     decrease inflammation and decrease pain to improve patient's ability to perform pain free ADLs. min [] Estim, type/location:                                      []  att     []  unatt     []  w/US     []  w/ice    []  w/heat    min []  Mechanical Traction: type/lbs                   []  pro   []  sup   []  int   []  cont    []  before manual    []  after manual    min []  Ultrasound, settings/location:      min []  Iontophoresis w/ dexamethasone, location:                                               []  take home patch       []  in clinic   10 min [x]  Ice     []  Heat    location/position: Supine, (B) knees. min []  Vasopneumatic Device, press/temp:     min []  Other:    [x] Skin assessment post-treatment (if applicable):    [x]  intact    []  redness- no adverse reaction     []redness  adverse reaction:        40 min Therapeutic Exercise:  [x]  See flow sheet   Rationale:      increase ROM, increase strength, improve coordination and improve balance to improve the patients ability to perform pain free ADLs. 15 Min Manual Therapy: STM/DTM (B) quad. (B) knee PROM. Rationale:      decrease pain, increase ROM, increase tissue extensibility and decrease trigger points to improve patient's ability to perform pain free ADLs. min Patient Education:  YES  Reviewed HEP   []  Progressed/Changed HEP based on: Other Objective/Functional Measures: Therex per flow sheet. Increased resistance and repetitions with multiple exercises to improve LE strength and stability for ADLs. Post Treatment Pain Level (on 0 to 10) scale:   0  / 10     ASSESSMENT  Assessment/Changes in Function:     No exacerbation of symptoms with today's session. []  See Progress Note/Recertification   Patient will continue to benefit from skilled PT services to modify and progress therapeutic interventions, address functional mobility deficits, address ROM deficits, address strength deficits, analyze and address soft tissue restrictions, analyze and cue movement patterns, analyze and modify body mechanics/ergonomics and assess and modify postural abnormalities to attain remaining goals. Progress toward goals / Updated goals:    No change in progress toward LTG's with today's session.       PLAN  [x]  Upgrade activities as tolerated YES Continue plan of care   []  Discharge due to :    []  Other:      Therapist: Phong Castaneda PTA    Date: 8/14/2017 Time: 5:22 PM     Future Appointments  Date Time Provider Ronaldo Cabrera   8/16/2017 5:00 PM Phong Castaneda PTA [facility]   8/18/2017 8:30 AM [address] [place] [address], [facility]   8/21/2017 5:00 PM Phong Castaneda PTA [facility]   8/23/2017 5:00 PM Phong Castaneda [facility]   8/25/2017 8:30 AM [address] [place] [address], [facility]   8/28/2017 5:00 PM Phong Castaneda PTA [facility]   8/30/2017 5:00 PM Phong Castaneda [facility]   9/1/2017 8:30 AM John Parsons [facility]

## 2017-08-16 ENCOUNTER — APPOINTMENT (OUTPATIENT)
Dept: PHYSICAL THERAPY | Age: 62
End: 2017-08-16
Payer: COMMERCIAL

## 2017-08-18 ENCOUNTER — HOSPITAL ENCOUNTER (OUTPATIENT)
Dept: PHYSICAL THERAPY | Age: 62
Discharge: HOME | End: 2017-08-18
Payer: COMMERCIAL

## 2017-08-18 PROCEDURE — 97140 MANUAL THERAPY 1/> REGIONS: CPT

## 2017-08-18 PROCEDURE — 97110 THERAPEUTIC EXERCISES: CPT

## 2017-08-18 NOTE — PROGRESS NOTES
PHYSICAL THERAPY - DAILY TREATMENT NOTE    Patient Name: Jimena Salinas        Date: 2017  : 1955   yes Patient  Verified  Visit #:     Insurance: Payor: BLUE CROSS / Plan: [employer]  / Product Type: PPO /      In time: 8:22 Out time: 9:22   Total Treatment Time: 60     Medicare Time Tracking (below)   Total Timed Codes (min):  na 1:1 Treatment Time:  na     TREATMENT AREA =  Bilateral knee pain [M25.561, M25.562]  Postoperative pain, acute, knee [M25.569, G89.18]    SUBJECTIVE  Pain Level (on 0 to 10 scale):  0  / 10   Medication Changes/New allergies or changes in medical history, any new surgeries or procedures?    no  If yes, update Summary List   Subjective Functional Status/Changes:  []  No changes reported     Pt reports MD was \"very pleased\" with her progress.  Reports he gave an A+ to the R and A to the L.          OBJECTIVE  Modalities Rationale:     decrease inflammation and decrease pain to improve patient's ability to ambulate, stand   min [] Estim, type/location:                                      []  att     []  unatt     []  w/US     []  w/ice    []  w/heat    min []  Mechanical Traction: type/lbs                   []  pro   []  sup   []  int   []  cont    []  before manual    []  after manual    min []  Ultrasound, settings/location:      min []  Iontophoresis w/ dexamethasone, location:                                               []  take home patch       []  in clinic   10 min [x]  Ice     []  Heat    location/position: To B knee in supine    min []  Vasopneumatic Device, press/temp:     min []  Other:    [x] Skin assessment post-treatment (if applicable):    [x]  intact    []  redness- no adverse reaction     []redness  adverse reaction:        38 min Therapeutic Exercise:  _  See flow sheet   Rationale:      increase ROM, increase strength, improve balance and increase proprioception to improve the patients ability to ambulate     12 min Manual Therapy: CFM to B incision, inf patella mob, PROM knee flexion B   Rationale:      decrease pain, increase ROM, increase tissue extensibility and decrease trigger points to improve patient's ability to ambulate     min Patient Education:  yes  Reviewed HEP   []  Progressed/Changed HEP based on: Other Objective/Functional Measures:    Dec resistance noted to L knee flexion PROM  Pt req cueing to reduce UE A on // bars w/ lateral tap downs       Post Treatment Pain Level (on 0 to 10) scale:   0  / 10     ASSESSMENT  Assessment/Changes in Function:     Pt shows improving knee flexion PROM. Ambulating in clinic w/o AD, demonstrating improving symmetry of gait and evidence for imbalance     []  See Progress Note/Recertification   Patient will continue to benefit from skilled PT services to modify and progress therapeutic interventions, address functional mobility deficits, address ROM deficits, address strength deficits, analyze and address soft tissue restrictions, analyze and cue movement patterns, address imbalance/dizziness and instruct in home and community integration to attain remaining goals.    Progress toward goals / Updated goals:    Pt progressing towards all goals steadily     PLAN  []  Upgrade activities as tolerated yes Continue plan of care   []  Discharge due to :    []  Other:      Therapist: Es Claudio, PT    Date: 8/18/2017 Time: 8:51 AM     Future Appointments  Date Time Provider Ronaldo Cabrera   8/21/2017 5:00 PM Terra Iha, PTA [facility]   8/23/2017 5:00 PM Terra Iha, PTA [facility]   8/25/2017 8:30 AM Es Claudio, GEOFF [facility]   8/28/2017 5:00 PM Terra Iha, PTA [facility]   8/30/2017 5:00 PM Terra Iha, PTA [facility]   9/1/2017 8:30 AM Ban Moore, [facility]

## 2017-08-21 ENCOUNTER — APPOINTMENT (OUTPATIENT)
Dept: PHYSICAL THERAPY | Age: 62
End: 2017-08-21
Payer: COMMERCIAL

## 2017-08-23 ENCOUNTER — HOSPITAL ENCOUNTER (OUTPATIENT)
Dept: PHYSICAL THERAPY | Age: 62
Discharge: HOME | End: 2017-08-23
Payer: COMMERCIAL

## 2017-08-23 PROCEDURE — 97140 MANUAL THERAPY 1/> REGIONS: CPT

## 2017-08-23 PROCEDURE — 97110 THERAPEUTIC EXERCISES: CPT

## 2017-08-23 NOTE — PROGRESS NOTES
PHYSICAL THERAPY - DAILY TREATMENT NOTE    Patient Name: Desire Camara        Date: 2017  : 1955   YES Patient  Verified  Visit #:   16      20  Insurance: Payor: BLUE CROSS / Plan: [employer] [facility] / Product Type: PPO /      In time: 455 Out time: 6   Total Treatment Time: 65     Medicare Time Tracking (below)   Total Timed Codes (min):  55 1:1 Treatment Time:       TREATMENT AREA =  Bilateral knee pain [M25.561, M25.562]  Postoperative pain, acute, knee [M25.569, G89.18]    SUBJECTIVE  Pain Level (on 0 to 10 scale):  1-2  / 10   Medication Changes/New allergies or changes in medical history, any new surgeries or procedures? NO    If yes, update Summary List   Subjective Functional Status/Changes:  []  No changes reported     \"The knees have been pretty sore lately. I think I've been walking too much. \"       OBJECTIVE  Modalities Rationale:     decrease inflammation and decrease pain to improve patient's ability to perform pain free ADLs. min [] Estim, type/location:                                      []  att     []  unatt     []  w/US     []  w/ice    []  w/heat    min []  Mechanical Traction: type/lbs                   []  pro   []  sup   []  int   []  cont    []  before manual    []  after manual    min []  Ultrasound, settings/location:      min []  Iontophoresis w/ dexamethasone, location:                                               []  take home patch       []  in clinic   10 min [x]  Ice     []  Heat    location/position: Supine, (B) knees. min []  Vasopneumatic Device, press/temp:     min []  Other:    [x] Skin assessment post-treatment (if applicable):    [x]  intact    []  redness- no adverse reaction     []redness  adverse reaction:        40 min Therapeutic Exercise:  [x]  See flow sheet   Rationale:      increase ROM, increase strength, improve coordination and improve balance to improve the patients ability to ambulate.       15 Min Manual Therapy: STM/DTM (B) quad. Patellar mobs/scar massage. PROM flexion. Rationale:      decrease pain, increase ROM, increase tissue extensibility and decrease trigger points to improve patient's ability to perform pain free ADLs. min Patient Education:  YES  Reviewed HEP   []  Progressed/Changed HEP based on: Other Objective/Functional Measures: Therex per flow sheet. Post Treatment Pain Level (on 0 to 10) scale:   0  / 10     ASSESSMENT  Assessment/Changes in Function:     Knee flexion remains limited. Poor tolerance to flexion w/ manual OP.      []  See Progress Note/Recertification   Patient will continue to benefit from skilled PT services to modify and progress therapeutic interventions, address functional mobility deficits, address ROM deficits, address strength deficits, analyze and address soft tissue restrictions, analyze and cue movement patterns, analyze and modify body mechanics/ergonomics and assess and modify postural abnormalities to attain remaining goals. Progress toward goals / Updated goals:    No change in progress toward LTG's with today's session.       PLAN  [x]  Upgrade activities as tolerated YES Continue plan of care   []  Discharge due to :    []  Other:      Therapist: Stephanie Alex PTA    Date: 8/23/2017 Time: 5:24 PM     Future Appointments  Date Time Provider Ronaldo Cabrera   8/25/2017 8:30 AM Leoncio Echola, PT [address]   8/28/2017 5:00 PM Stephanie Alex PTA [place] [address]   8/30/2017 5:00 PM Stephanie Alex, [place] [address]   9/1/2017 8:30 AM Rafael Camera, PTA [address]

## 2017-08-25 ENCOUNTER — HOSPITAL ENCOUNTER (OUTPATIENT)
Dept: PHYSICAL THERAPY | Age: 62
Discharge: HOME | End: 2017-08-25
Payer: COMMERCIAL

## 2017-08-25 PROCEDURE — 97140 MANUAL THERAPY 1/> REGIONS: CPT

## 2017-08-25 PROCEDURE — 97110 THERAPEUTIC EXERCISES: CPT

## 2017-08-28 ENCOUNTER — HOSPITAL ENCOUNTER (OUTPATIENT)
Dept: PHYSICAL THERAPY | Age: 62
Discharge: HOME | End: 2017-08-28
Payer: COMMERCIAL

## 2017-08-28 PROCEDURE — 97140 MANUAL THERAPY 1/> REGIONS: CPT

## 2017-08-28 PROCEDURE — 97110 THERAPEUTIC EXERCISES: CPT

## 2017-08-28 NOTE — PROGRESS NOTES
PHYSICAL THERAPY - DAILY TREATMENT NOTE    Patient Name: Jazmin Eden        Date: 2017  : 1955   YES Patient  Verified  Visit #:     Insurance: Payor: BLUE CROSS / Plan: [employer] [facility] / Product Type: PPO /      In time: 5 Out time: 6   Total Treatment Time: 60     Medicare Time Tracking (below)   Total Timed Codes (min):  50 1:1 Treatment Time:       TREATMENT AREA =  Bilateral knee pain [M25.561, M25.562]  Postoperative pain, acute, knee [M25.569, G89.18]    SUBJECTIVE  Pain Level (on 0 to 10 scale):  1  / 10   Medication Changes/New allergies or changes in medical history, any new surgeries or procedures? NO    If yes, update Summary List   Subjective Functional Status/Changes:  []  No changes reported     No new complaints. OBJECTIVE  Modalities Rationale:     decrease inflammation and decrease pain to improve patient's ability to perform pain free ADLs. min [] Estim, type/location:                                      []  att     []  unatt     []  w/US     []  w/ice    []  w/heat    min []  Mechanical Traction: type/lbs                   []  pro   []  sup   []  int   []  cont    []  before manual    []  after manual    min []  Ultrasound, settings/location:      min []  Iontophoresis w/ dexamethasone, location:                                               []  take home patch       []  in clinic   10 min [x]  Ice     []  Heat    location/position: (B) knees. Supine. min []  Vasopneumatic Device, press/temp:     min []  Other:    [x] Skin assessment post-treatment (if applicable):    [x]  intact    []  redness- no adverse reaction     []redness  adverse reaction:        40 min Therapeutic Exercise:  [x]  See flow sheet   Rationale:      increase ROM, increase strength, improve coordination and improve balance to improve the patients ability to perform pain free ADLs. 10 Min Manual Therapy: (B) knee PROM flexion. Scar massage and patellar mobs. Rationale:      decrease pain, increase ROM, increase tissue extensibility and decrease trigger points to improve patient's ability to ambulate. min Patient Education:  YES  Reviewed HEP   []  Progressed/Changed HEP based on: Other Objective/Functional Measures: Therex per flow sheet. Post Treatment Pain Level (on 0 to 10) scale:   0  / 10     ASSESSMENT  Assessment/Changes in Function:     No exacerbation of symptoms with today's session. []  See Progress Note/Recertification   Patient will continue to benefit from skilled PT services to modify and progress therapeutic interventions, address functional mobility deficits, address ROM deficits, address strength deficits, analyze and address soft tissue restrictions, analyze and cue movement patterns, analyze and modify body mechanics/ergonomics and assess and modify postural abnormalities to attain remaining goals. Progress toward goals / Updated goals:    No change in progress toward LTG's with today's session.       PLAN  [x]  Upgrade activities as tolerated YES Continue plan of care   []  Discharge due to :    []  Other:      Therapist: Jayden Alva PTA    Date: 8/28/2017 Time: 5:30 PM     Future Appointments  Date Time Provider Ronaldo Cabrera   8/30/2017 5:00 PM Elie Martinez [facility]   9/1/2017 8:30 AM Andrei Huang [facility]   9/7/2017 6:30 AM Andrei Huang [facility]   9/13/2017 5:30 PM Jayden Alva [facility]   9/15/2017 6:30 AM Andrei Huang PTA [facility]   9/18/2017 5:30 PM Jayden Alva PTA [facility]   9/20/2017 5:30 PM Jayden Alva PTA [facility]   9/25/2017 5:30 PM Jayden Alva [facility]   9/27/2017 5:30 PM Jayden Alva [facility]

## 2017-08-30 ENCOUNTER — APPOINTMENT (OUTPATIENT)
Dept: PHYSICAL THERAPY | Age: 62
End: 2017-08-30
Payer: COMMERCIAL

## 2017-09-01 ENCOUNTER — HOSPITAL ENCOUNTER (OUTPATIENT)
Dept: PHYSICAL THERAPY | Age: 62
Discharge: HOME | End: 2017-09-01
Payer: COMMERCIAL

## 2017-09-01 PROCEDURE — 97140 MANUAL THERAPY 1/> REGIONS: CPT

## 2017-09-01 PROCEDURE — 97110 THERAPEUTIC EXERCISES: CPT

## 2017-09-01 NOTE — PROGRESS NOTES
PHYSICAL THERAPY - DAILY TREATMENT NOTE    Patient Name: Lul Dai        Date: 2017  : 1955   YES Patient  Verified  Visit #:     Insurance: Payor: BLUE CROSS / Plan: [employer] / Product Type: PPO /      In time: 830 Out time: 930   Total Treatment Time: 60     Medicare Time Tracking (below)   Total Timed Codes (min):   1:1 Treatment Time:       TREATMENT AREA =  Bilateral knee pain [M25.561, M25.562]  Postoperative pain, acute, knee [M25.569, G89.18]    SUBJECTIVE  Pain Level (on 0 to 10 scale):  0  / 10   Medication Changes/New allergies or changes in medical history, any new surgeries or procedures? NO    If yes, update Summary List   Subjective Functional Status/Changes:  []  No changes reported     Been walking a lot, especially being back at work. Haven't had any pain. OBJECTIVE  Modalities Rationale:     decrease inflammation and decrease pain to improve patient's ability to perform pain free ADLs. min [] Estim, type/location:                                      []  att     []  unatt     []  w/US     []  w/ice    []  w/heat    min []  Mechanical Traction: type/lbs                   []  pro   []  sup   []  int   []  cont    []  before manual    []  after manual    min []  Ultrasound, settings/location:      min []  Iontophoresis w/ dexamethasone, location:                                               []  take home patch       []  in clinic   10 min [x]  Ice     []  Heat    location/position: (B) knees. Supine. min []  Vasopneumatic Device, press/temp:     min []  Other:    [x] Skin assessment post-treatment (if applicable):    [x]  intact    [x]  redness- no adverse reaction     []redness  adverse reaction:        40 min Therapeutic Exercise:  [x]  See flow sheet   Rationale:      increase ROM and increase strength to improve the patients ability to perform pain free ADLs. 10 min Manual Therapy: (B) knee PROM flexion.   Scar massage and patellar mobs. Rationale:      decrease pain, increase ROM and increase tissue extensibility to improve patient's ability to perform pain free ADLs. min Patient Education:  YES  Reviewed HEP   []  Progressed/Changed HEP based on: Other Objective/Functional Measures:    Good ROM of (B) knee,      Post Treatment Pain Level (on 0 to 10) scale:   0  / 10     ASSESSMENT  Assessment/Changes in Function:     Ambulated in gym without use of AD and good balance. []  See Progress Note/Recertification   Patient will continue to benefit from skilled PT services to modify and progress therapeutic interventions, address functional mobility deficits, address ROM deficits, address strength deficits, analyze and address soft tissue restrictions and analyze and cue movement patterns to attain remaining goals. Progress toward goals / Updated goals:    Progressing steadily with ambulation and all functional mobility activities.      PLAN  []  Upgrade activities as tolerated YES Continue plan of care   []  Discharge due to :    []  Other:      Therapist: ERIC Velazquez    Date: 9/1/2017 Time: 6:24 AM       Future Appointments  Date Time Provider Ronaldo Cabrera   9/1/2017 8:30 AM Carlitos Ahr, [facility]   9/7/2017 6:30 AM Carlitos Ahr, [facility]   9/13/2017 5:30 PM Niels Coronado, [facility]   9/15/2017 6:30 AM Carlitos Ahr, [facility]   9/18/2017 5:30 PM Niels Coronado, [facility]   9/20/2017 5:30 PM Niels Coronado, [facility]   9/25/2017 5:30 PM Patcarli Coronado, [facility]   9/27/2017 5:30 PM Niels Coronado, [facility]

## 2017-09-07 ENCOUNTER — HOSPITAL ENCOUNTER (OUTPATIENT)
Dept: PHYSICAL THERAPY | Age: 62
Discharge: HOME | End: 2017-09-07
Payer: COMMERCIAL

## 2017-09-07 PROCEDURE — 97140 MANUAL THERAPY 1/> REGIONS: CPT

## 2017-09-07 PROCEDURE — 97110 THERAPEUTIC EXERCISES: CPT

## 2017-09-13 ENCOUNTER — HOSPITAL ENCOUNTER (OUTPATIENT)
Dept: PHYSICAL THERAPY | Age: 62
Discharge: HOME | End: 2017-09-13
Payer: COMMERCIAL

## 2017-09-13 PROCEDURE — 97140 MANUAL THERAPY 1/> REGIONS: CPT

## 2017-09-13 PROCEDURE — 97110 THERAPEUTIC EXERCISES: CPT

## 2017-09-13 NOTE — PROGRESS NOTES
[address] [place] [address] PHYSICAL THERAPY  [address],[facility], [address] - Phone: [phone]  Fax: [phone]  PROGRESS NOTE  Patient Name: Stacy Wright : 1955   Treatment/Medical Diagnosis: Bilateral knee pain [M25.561, M25.562]  Postoperative pain, acute, knee [M25.569, G89.18]   Referral Source: [facility], Not On File, MD     Date of Initial Visit: 2017 Attended Visits: 14 Missed Visits: 2     SUMMARY OF TREATMENT  PT has consisted of initial evaluation, therapeutic exercises, HEP, manual therapy, patient education, and modalities. CURRENT STATUS  [facility]. Pt currently reports 1/10 max pain. Pt demonstrating good LE strength, (B) quads 5/5, (R) HS 4/5, (L) HS 4+/5. (B) knee PROM = 0-120 deg. Current FOTO = 72, GROC = +7. Pt ambulates into the clinic without use of AD, slight limp. Pt reporting improvement in gait when she remembers to stand w/ better posture. Pt demonstrates (I) with current HEP. Goal/Measure of Progress Goal Met? 1. Pt will improve B quad strength to 5/5 to improve stability w/ descending stairs reciprocally   Status at last Eval: (R) 4+/5   (L) 4/5  Current Status: 5/5 quad strength (B) yes   2. Pt will demo I w/ long-term HEP in prep for DC   Status at last Eval: HEP established Current Status: Pt (I) and compliant with HEP  progressing   3. Improve FOTO score to >/= 56/100 to indicate improved function   Status at last Eval: 33/100 @ IE  Current Status: 72/100 current yes     New Goals to be achieved in __4__  weeks:  1. Pt to be (I) w/ long-term HEP in preparation for DC.    2. (B) knee AAROM flexion to be >120 deg to assist with pt's ability to navigate up/down steps. 3. Pt to ambulate w/o AD and good mechanics to indicate improved functional ability with ADLs.    RECOMMENDATIONS  Pt to continue 2-3x weekly for up to an additional 4 weeks to meet remaining goals and DC to HEP. Thank you for this referral.   If you have any questions/comments please contact us directly at [phone]. Thank you for allowing us to assist in the care of your patient. ERIC Signature: Isidoro Dye PTA  Date: 9/13/2017   PT Signature:  Time: 6:36 PM   NOTE TO PHYSICIAN:  PLEASE COMPLETE THE ORDERS BELOW AND FAX TO   [facility] Physical Therapy: [phone]  If you are unable to process this request in 24 hours please contact our office: [phone]    ___ I have read the above report and request that my patient continue as recommended.   ___ I have read the above report and request that my patient continue therapy with the following changes/special instructions:_________________________________________________________   ___ I have read the above report and request that my patient be discharged from therapy.      Physician Signature:        Date:       Time:

## 2017-09-13 NOTE — PROGRESS NOTES
PHYSICAL THERAPY - DAILY TREATMENT NOTE    Patient Name: Lindsey Toth        Date: 2017  : 1955   YES Patient  Verified  Visit #:     Insurance: Payor: BLUE CROSS / [facility] [employer] [facility] / Product Type: PPO /      In time: 530 Out time: 625   Total Treatment Time: 55     Medicare Time Tracking (below)   Total Timed Codes (min):  55 1:1 Treatment Time:       TREATMENT AREA =  Bilateral knee pain [M25.561, M25.562]  Postoperative pain, acute, knee [M25.569, G89.18]    SUBJECTIVE  Pain Level (on 0 to 10 scale):  0  / 10   Medication Changes/New allergies or changes in medical history, any new surgeries or procedures? NO    If yes, update Summary List   Subjective Functional Status/Changes:  []  No changes reported     No new complaints. Pt reports recent max pain of 1/10, just below knee cap bilaterally. \"I walk with less of a limp if I remember to stand up straight. \"           OBJECTIVE    40 min Therapeutic Exercise:  [x]  See flow sheet   Rationale:      increase ROM, increase strength, improve coordination and improve balance to improve the patients ability to perform pain free ADLs. 15 Min Manual Therapy: (R) knee PROM. Rationale:      decrease pain, increase ROM, increase tissue extensibility and decrease trigger points to improve patient's ability to perform pain free ADLs. min Patient Education:  YES  Reviewed HEP   []  Progressed/Changed HEP based on:         Other Objective/Functional Measures:    FOTO = 72   GROC = +7    (B) knee PROM = 0-120 deg    (B) knee ext = 5/5   (R) knee flex = 4/5  (L) knee flex = 4+/5      Post Treatment Pain Level (on 0 to 10) scale:   0   / 10     ASSESSMENT  Assessment/Changes in Function:     See note     []  See Progress Note/Recertification   Patient will continue to benefit from skilled PT services to modify and progress therapeutic interventions, address functional mobility deficits, address ROM deficits, address strength deficits, analyze and address soft tissue restrictions, analyze and cue movement patterns, analyze and modify body mechanics/ergonomics and assess and modify postural abnormalities to attain remaining goals.    Progress toward goals / Updated goals:    See note     PLAN  [x]  Upgrade activities as tolerated YES Continue plan of care   []  Discharge due to :    []  Other:      Therapist: Jayden Alva PTA    Date: 9/13/2017 Time: 5:51 PM     Future Appointments  Date Time Provider Ronaldo Cabrera   9/15/2017 6:30 AM Andrei Huang [facility]   9/18/2017 5:30 PM Jayden Alva PTA [facility]   9/20/2017 5:30 PM Jayden Alva PTA [facility]   9/25/2017 5:30 PM Jayden Alva [facility]   9/27/2017 5:30 PM Jayden lAva [facility]

## 2017-09-15 ENCOUNTER — HOSPITAL ENCOUNTER (OUTPATIENT)
Dept: PHYSICAL THERAPY | Age: 62
Discharge: HOME | End: 2017-09-15
Payer: COMMERCIAL

## 2017-09-15 PROCEDURE — 97110 THERAPEUTIC EXERCISES: CPT

## 2017-09-15 PROCEDURE — 97140 MANUAL THERAPY 1/> REGIONS: CPT

## 2017-09-15 NOTE — PROGRESS NOTES
PHYSICAL THERAPY - DAILY TREATMENT NOTE    Patient Name: Jimena Salinas        Date: 9/15/2017  : 1955   YES Patient  Verified  Visit #:     Insurance: Payor: BLUE CROSS / Plan: [employer] [facility] / Product Type: PPO /      In time: 635 Out time: 725   Total Treatment Time: 50     Medicare Time Tracking (below)   Total Timed Codes (min):   1:1 Treatment Time:       TREATMENT AREA =  Bilateral knee pain [M25.561, M25.562]  Postoperative pain, acute, knee [M25.569, G89.18]    SUBJECTIVE  Pain Level (on 0 to 10 scale):  1  / 10 (R) knee   Medication Changes/New allergies or changes in medical history, any new surgeries or procedures? NO    If yes, update Summary List   Subjective Functional Status/Changes:  []  No changes reported     i'vr been walking about 1/8 of a mile 1-2x/day and I go up and down the stairs 1x/day. Also went to living museum and walked around there. OBJECTIVE    40 min Therapeutic Exercise:  [x]  See flow sheet   Rationale:      increase ROM and increase strength to improve the patients ability to perform pain free ADLs. 10 min Manual Therapy: (B) knee PROM/patella mob and HS stretch. Rationale:      decrease pain, increase ROM and increase tissue extensibility to improve patient's ability to perform pain free ADLs. min Patient Education:  YES  Reviewed HEP   []  Progressed/Changed HEP based on: Other Objective/Functional Measures:    Good PROM of (B) knee (R) > (L). Post Treatment Pain Level (on 0 to 10) scale:   0  / 10     ASSESSMENT  Assessment/Changes in Function:     Ambulated into gym without use of AD and gait appeared [facility]. Step up on 8\" step with Vc's to bend knee and not swing around with opposite knee.      []  See Progress Note/Recertification   Patient will continue to benefit from skilled PT services to modify and progress therapeutic interventions, address functional mobility deficits, address ROM deficits, address strength deficits, analyze and address soft tissue restrictions and analyze and cue movement patterns to attain remaining goals.    Progress toward goals / Updated goals:    No change toward new goals     PLAN  []  Upgrade activities as tolerated YES Continue plan of care   []  Discharge due to :    []  Other:      Therapist: ERIC Maldonado    Date: 9/15/2017 Time: 6:13 AM       Future Appointments  Date Time Provider Ronaldo Cabrera   9/15/2017 6:30 AM Kali Bowers [facility]   9/18/2017 5:30 PM Kevin Tapia PTA [facility]   9/20/2017 5:30 PM Kevin Tapia [facility]   9/25/2017 5:30 PM Kevin Tapia [facility]   9/27/2017 5:30 PM Kevin Tapia, [facility]

## 2017-09-18 ENCOUNTER — HOSPITAL ENCOUNTER (OUTPATIENT)
Dept: PHYSICAL THERAPY | Age: 62
Discharge: HOME | End: 2017-09-18
Payer: COMMERCIAL

## 2017-09-18 PROCEDURE — 97140 MANUAL THERAPY 1/> REGIONS: CPT

## 2017-09-18 PROCEDURE — 97110 THERAPEUTIC EXERCISES: CPT

## 2017-09-18 NOTE — PROGRESS NOTES
PHYSICAL THERAPY - DAILY TREATMENT NOTE    Patient Name: Debora Huggins        Date: 2017  : 1955   YES Patient  Verified  Visit #:     Insurance: Payor: BLUE CROSS / Plan: [employer] [facility] / Product Type: PPO /      In time: 525 Out time: 610   Total Treatment Time: 45     Medicare Time Tracking (below)   Total Timed Codes (min):  45 1:1 Treatment Time:       TREATMENT AREA =  Bilateral knee pain [M25.561, M25.562]  Postoperative pain, acute, knee [M25.569, G89.18]    SUBJECTIVE  Pain Level (on 0 to 10 scale):  2  / 10   Medication Changes/New allergies or changes in medical history, any new surgeries or procedures? NO    If yes, update Summary List   Subjective Functional Status/Changes:  []  No changes reported     \"I'd say I stood for a solid 4 hours yesterday. \"  Pt reports some soreness/pain today from being on her feet a lot while volunteering this weekend. OBJECTIVE    30 min Therapeutic Exercise:  [x]  See flow sheet   Rationale:      increase ROM, increase strength, improve coordination and improve balance to improve the patients ability to perform pain free ADLs. 15 min Manual Therapy: (B) knee PROM w/ flexion OP. Rationale:      decrease pain, increase ROM, increase tissue extensibility and decrease trigger points to improve patient's ability to perform pain free ADLs. min Patient Education:  YES  Reviewed HEP   []  Progressed/Changed HEP based on: Other Objective/Functional Measures: Therex per flow sheet. Post Treatment Pain Level (on 0 to 10) scale:   .5   / 10     ASSESSMENT  Assessment/Changes in Function:     Good response to movement.   Decrease in pain and stiffness per pt.      []  See Progress Note/Recertification   Patient will continue to benefit from skilled PT services to modify and progress therapeutic interventions, address functional mobility deficits, address ROM deficits, address strength deficits, analyze and address soft tissue restrictions, analyze and cue movement patterns, analyze and modify body mechanics/ergonomics and assess and modify postural abnormalities to attain remaining goals. Progress toward goals / Updated goals:    No change in progress toward LTG's with today's session.       PLAN  [x]  Upgrade activities as tolerated YES Continue plan of care   []  Discharge due to :    []  Other:      Therapist: Trudy Vela PTA    Date: 9/18/2017 Time: 5:30 PM     Future Appointments  Date Time Provider Ronaldo Cabrera   9/20/2017 5:30 PM Odalys Jenkins [facility]   9/25/2017 5:30 PM Trudy Vela PTA [facility]   9/27/2017 5:30 PM Trudy Vela PTA [facility]

## 2017-09-20 ENCOUNTER — HOSPITAL ENCOUNTER (OUTPATIENT)
Dept: PHYSICAL THERAPY | Age: 62
Discharge: HOME | End: 2017-09-20
Payer: COMMERCIAL

## 2017-09-20 PROCEDURE — 97110 THERAPEUTIC EXERCISES: CPT

## 2017-09-20 PROCEDURE — 97140 MANUAL THERAPY 1/> REGIONS: CPT

## 2017-09-20 NOTE — PROGRESS NOTES
PHYSICAL THERAPY - DAILY TREATMENT NOTE    Patient Name: Stacy Wright        Date: 2017  : 1955   YES Patient  Verified  Visit #:     Insurance: Payor: BLUE CROSS / Plan: [facility] / Product Type: PPO /      In time: 530 Out time: 620   Total Treatment Time: 50     Medicare Time Tracking (below)   Total Timed Codes (min):  50 1:1 Treatment Time:       TREATMENT AREA =  Bilateral knee pain [M25.561, M25.562]  Postoperative pain, acute, knee [M25.569, G89.18]    SUBJECTIVE  Pain Level (on 0 to 10 scale):  1  / 10   Medication Changes/New allergies or changes in medical history, any new surgeries or procedures? NO    If yes, update Summary List   Subjective Functional Status/Changes:  []  No changes reported     \"I had a flu shot Monday. The site turned red and all my joints have been sore since. \"          OBJECTIVE  35 min Therapeutic Exercise:  [x]  See flow sheet   Rationale:      increase ROM, increase strength, improve coordination and improve balance to improve the patients ability to perform pain free ADLs. 15 min Manual Therapy: (B) knee PROM. Rationale:      decrease pain, increase ROM, increase tissue extensibility and decrease trigger points to improve patient's ability to perform pain free ADLs. min Patient Education:  YES  Reviewed HEP   []  Progressed/Changed HEP based on: Other Objective/Functional Measures: Therex per flow sheet. Post Treatment Pain Level (on 0 to 10) scale:   0  / 10     ASSESSMENT  Assessment/Changes in Function:     Knee flexion ROM remains limited.        []  See Progress Note/Recertification   Patient will continue to benefit from skilled PT services to modify and progress therapeutic interventions, address functional mobility deficits, address ROM deficits, address strength deficits, analyze and address soft tissue restrictions, analyze and cue movement patterns, analyze and modify body mechanics/ergonomics and assess and modify postural abnormalities to attain remaining goals. Progress toward goals / Updated goals:    No change in progress toward LTG's with today's session.       PLAN  [x]  Upgrade activities as tolerated YES Continue plan of care   []  Discharge due to :    []  Other:      Therapist: Josue Benedict PTA    Date: 9/20/2017 Time: 5:37 PM     Future Appointments  Date Time Provider Ronaldo Cabrera   9/25/2017 5:30 PM Sully Goel [facility]   9/27/2017 5:30 PM Josue Benedict PTA [facility]

## 2017-09-25 ENCOUNTER — HOSPITAL ENCOUNTER (OUTPATIENT)
Dept: PHYSICAL THERAPY | Age: 62
Discharge: HOME | End: 2017-09-25
Payer: COMMERCIAL

## 2017-09-25 PROCEDURE — 97110 THERAPEUTIC EXERCISES: CPT

## 2017-09-25 NOTE — PROGRESS NOTES
PHYSICAL THERAPY - DAILY TREATMENT NOTE    Patient Name: Opal Villegas        Date: 2017  : 1955   YES Patient  Verified  Visit #:      30  Insurance: Payor: BLUE CROSS / Plan: [employer] [facility] / Product Type: PPO /      In time: 515 Out time: 6   Total Treatment Time: 45     Medicare Time Tracking (below)   Total Timed Codes (min):  45 1:1 Treatment Time:       TREATMENT AREA =  Bilateral knee pain [M25.561, M25.562]  Postoperative pain, acute, knee [M25.569, G89.18]    SUBJECTIVE  Pain Level (on 0 to 10 scale):  0  / 10   Medication Changes/New allergies or changes in medical history, any new surgeries or procedures? NO    If yes, update Summary List   Subjective Functional Status/Changes:  []  No changes reported     No new complaints. OBJECTIVE  45 min Therapeutic Exercise:  [x]  See flow sheet   Rationale:      increase ROM, increase strength, improve coordination and improve balance to improve the patients ability to perform pain free ADLs. min Patient Education:  YES  Reviewed HEP   []  Progressed/Changed HEP based on: Other Objective/Functional Measures: Therex per flow sheet. Added heel slides to further improve knee flexion PROM. Knee AROM = (L) 0-121 deg, (R) 0-122 deg      Post Treatment Pain Level (on 0 to 10) scale:   0   / 10     ASSESSMENT  Assessment/Changes in Function:     No pain throughout session. Pt demonstrating knee ROM >120 (B). Reviewed HEP exercises and instructed pt we would DC at next visit. []  See Progress Note/Recertification   Patient will continue to benefit from skilled PT services to modify and progress therapeutic interventions, address functional mobility deficits, address ROM deficits, address strength deficits, analyze and address soft tissue restrictions, analyze and cue movement patterns, analyze and modify body mechanics/ergonomics and assess and modify postural abnormalities to attain remaining goals. Progress toward goals / Updated goals:    No change in progress toward LTG's with today's session.       PLAN  []  Upgrade activities as tolerated no Continue plan of care   [x]  Discharge due to : Program complete   []  Other:      Therapist: Niels Coronado PTA    Date: 9/25/2017 Time: 6:02 PM     Future Appointments  Date Time Provider Ronaldo Cabrera   9/27/2017 5:30 PM Niels Coronado PTA [facility]

## 2017-09-27 ENCOUNTER — HOSPITAL ENCOUNTER (OUTPATIENT)
Dept: PHYSICAL THERAPY | Age: 62
Discharge: HOME | End: 2017-09-27
Payer: COMMERCIAL

## 2017-09-27 PROCEDURE — 97110 THERAPEUTIC EXERCISES: CPT

## 2017-09-27 NOTE — PROGRESS NOTES
[facility] PHYSICAL THERAPY  [address] [place] [address] [address], [address],[facility], [address] [facility] - Phone: [phone]  Fax: [phone]  DISCHARGE SUMMARY  Patient Name: Nette Heredia : 1955   Treatment/Medical Diagnosis: Bilateral knee pain [M25.561, M25.562]  Postoperative pain, acute, knee [M25.569, G89.18]   Referral Source: Paige Allan. *     Date of Initial Visit: 2017 Attended Visits: 27 Missed Visits: 2     SUMMARY OF TREATMENT  PT has consisted of initial evaluation, therapeutic exercises, therapeutic activities, HEP, manual therapy, patient education, and modalities. CURRENT STATUS  [facility] s/p (B) TKA. Pt currently reports 1/10 max pain. Pt demonstrating good LE strength, (B) quads 5/5, (R) HS 4/5, (L) HS 4+/5. Knee AROM = (R) 0-122, (L) 0-121 deg. Current FOTO = 72, GROC = +7. Pt ambulates in clinic w/o AD, good mechanics, no LOB. Pt reports continued use of cane on long-distance walks. Pt demonstrates (I) with long-term HEP.         Goal/Measure of Progress Goal Met? 1.  Pt to be (I) w/ long-term HEP in preparation for DC. Status at last Eval: Pt (I) and compliant with HEP  Current Status: Pt (I) and compliant with long-term HEP yes   2. (B) knee AAROM flexion to be >120 deg to assist with pt's ability to navigate up/down steps. Status at last Eval: AROM   (R) 0-120   (L) 0-112 Current Status: AROM   (R) 0-122  (L) 0-121 yes   3. Pt to ambulate w/o AD and good mechanics to indicate improved functional ability with ADLs. Status at last Eval: [facility] Current Status: SPC for long-distance gait. No AD in clinic w/ good mechanics. yes     RECOMMENDATIONS  Discontinue therapy. Progressing towards or have reached established goals. If you have any questions/comments please contact us directly at [phone]. Thank you for allowing us to assist in the care of your patient.     LPTA Signature: Cl Macias, PTA Date: 9/27/2017   Therapist Signature: Ricardo Bernal PT, DPT, CMTPT, [facility] Time: 6:36 PM

## 2017-09-27 NOTE — PROGRESS NOTES
PHYSICAL THERAPY - DAILY TREATMENT NOTE    Patient Name: Gaviota Justin        Date: 2017  : 1955   YES Patient  Verified  Visit #:   32   of   30  Insurance: Payor: BLUE CROSS / Plan: [facility] / Product Type: PPO /      In time: 525 Out time: 605   Total Treatment Time: 40     Medicare Time Tracking (below)   Total Timed Codes (min):  40 1:1 Treatment Time:  40     TREATMENT AREA =  Bilateral knee pain [M25.561, M25.562]  Postoperative pain, acute, knee [M25.569, G89.18]    SUBJECTIVE  Pain Level (on 0 to 10 scale):  0   / 10   Medication Changes/New allergies or changes in medical history, any new surgeries or procedures? NO    If yes, update Summary List   Subjective Functional Status/Changes:  []  No changes reported     \"I feel pretty good about where I'm at. \"    States she is no longer using the cane at work. Only uses it in big, open spaces. OBJECTIVE  40 min Therapeutic Exercise:  [x]  See flow sheet   Rationale:      increase ROM, increase strength, improve coordination and improve balance to improve the patients ability to perform pain free ADLs. min Patient Education:  YES  Reviewed HEP   []  Progressed/Changed HEP based on: Other Objective/Functional Measures:    Ambulation w/o cane in clinic w/ good mechanics, no LOB     Post Treatment Pain Level (on 0 to 10) scale:   0  / 10     ASSESSMENT  Assessment/Changes in Function:     See DC      []  See Progress Note/Recertification   Patient will continue to benefit from skilled PT services to modify and progress therapeutic interventions, address functional mobility deficits, address ROM deficits, address strength deficits, analyze and address soft tissue restrictions, analyze and cue movement patterns, analyze and modify body mechanics/ergonomics and assess and modify postural abnormalities to attain remaining goals.    Progress toward goals / Updated goals:    See DC      PLAN  []  Upgrade activities as tolerated No  Continue plan of care   [x]  Discharge due to : Program complete, goals met. []  Other:      Therapist: Bárbara Vela PTA    Date: 9/27/2017 Time: 5:55 PM     No future appointments.

## 2018-06-19 ENCOUNTER — HOSPITAL ENCOUNTER (OUTPATIENT)
Dept: PHYSICAL THERAPY | Age: 63
Discharge: HOME | End: 2018-06-19
Payer: COMMERCIAL

## 2018-06-19 PROCEDURE — 97140 MANUAL THERAPY 1/> REGIONS: CPT

## 2018-06-19 PROCEDURE — 97162 PT EVAL MOD COMPLEX 30 MIN: CPT

## 2018-06-19 NOTE — PROGRESS NOTES
PHYSICAL THERAPY - DAILY TREATMENT NOTE    Patient Name: Scar Pichardo        Date: 2018  : 1955   YES Patient  Verified  Visit #:   1   of   1  Insurance: Payor: BLUE CROSS / [facility] / Product Type: PPO /      In time: 3:10 Out time: 3:35   Total Treatment Time: 25     Medicare Time Tracking (below)   Total Timed Codes (min):  na 1:1 Treatment Time:  na     TREATMENT AREA =  Bilateral leg pain [M79.604, M79.605]  Other abnormalities of gait and mobility [R26.89]    SUBJECTIVE  Pain Level (on 0 to 10 scale):    Medication Changes/New allergies or changes in medical history, any new surgeries or procedures? NO    If yes, update Summary List   Subjective Functional Status/Changes:  []  No changes reported     SEE IE          OBJECTIVE    10 min Manual Therapy: Midfoot mob L    Rationale:      decrease pain, increase ROM and increase tissue extensibility to improve patient's ability to amb     min Patient Education:  YES  Reviewed HEP   []  Progressed/Changed HEP based on: Other Objective/Functional Measures:    SEE IE     Post Treatment Pain Level (on 0 to 10) scale:       ASSESSMENT  Assessment/Changes in Function:     SEE IE     []  See Progress Note/Recertification   Patient will continue to benefit from skilled PT services to modify and progress therapeutic interventions, address functional mobility deficits, address ROM deficits, address strength deficits, analyze and address soft tissue restrictions, analyze and cue movement patterns, analyze and modify body mechanics/ergonomics and assess and modify postural abnormalities to attain remaining goals. Progress toward goals / Updated goals:         PLAN  []  Upgrade activities as tolerated YES Continue plan of care   []  Discharge due to :    []  Other:      Therapist: Zev Vega, PT, OCS, SCS, CSCS    Date: 2018 Time: 3:28 PM       No future appointments.

## 2018-06-19 NOTE — PROGRESS NOTES
[address] PHYSICAL THERAPY  [address] [place] [address] [address], [address],Vickie Hart, [address] [facility] - Phone: [phone]  Fax: [phone] OF [facility] / [address]  Patient Name: Moises Garcia : 1955   Medical   Diagnosis: Bilateral leg pain [M79.604, V37.967]  Other abnormalities of gait and mobility [R26.89] Treatment Diagnosis: Bilateral leg pain [M79.604, S58.423]  Other abnormalities of gait and mobility [R26.89]   Onset Date: 17     Referral Source: Jessica Bocanegra. * Start of Care [facility]): 2018   Prior Hospitalization: See medical history Provider #: [id number]   Prior Level of Function: Difficulty with ambulation    Comorbidities: Depression    Medications: Verified on Patient Summary List   The Plan of Care and following information is based on the information from the initial evaluation.   ===========================================================================================  Assessment / otto information:  Moises Garcia is a 63 y.o.  yo female with Dx of Bilateral leg pain [M79.604, M79.605]  Other abnormalities of gait and mobility [R26.89]. .  She reports having B TKA on 17. She reports no pain at her knees. The only complain she has is that her daughter states that she limps. Objective Findings:  Gait: slightly decreased L step length noted. No other significant gait abnormality noted.  , Knee AROM: R = 3-115 deg, L = 3-115 deg. Manual Muscle Testing: All LE strength are WNL. Palpation:  Decreased L mid foot mobility noted with increased soft tissue tension noted at L rectus femoris. We belive that Pt will be able to continue to progress with her gait independently.  Pt instructed in HEP and will not f/u in clinic for PT.  ===========================================================================================  Eval Complexity: History MEDIUM  Complexity : 1-2 comorbidities / personal factors will impact the outcome/ POC ;  Examination  MEDIUM Complexity : 3 Standardized tests and measures addressing body structure, function, activity limitation and / or participation in recreation ; Presentation MEDIUM Complexity : Evolving with changing characteristics ; Decision Making LOW Complexity : FOTO score of ; Overall Complexity LOW   Problem List: decrease ROM and decrease flexibility/ joint mobility   Treatment Plan may include any combination of the following: Patient education  Patient / Family readiness to learn indicated by: asking questions, trying to perform skills and interest  Persons(s) to be included in education: patient (P)  Barriers to Learning/Limitations: no  Measures taken: FOTO = 99%   Patient Goal (s): Walk better   Patient self reported health status: excellent  Rehabilitation Potential: good   Short Term Goals: To be accomplished in  1  weeks:  1. Independent with HEP. Frequency / Duration:   Patient to be seen  1  times for HEP:  Patient / Caregiver education and instruction: self care and exercises    Therapist Signature: Jennifer Pillai, DPT, OCS, SCS, CSCS Date: 8/84/0291   Certification Period: na Time: 3:29 PM   ===========================================================================================  I certify that the above Physical Therapy Services are being furnished while the patient is under my care. I agree with the treatment plan and certify that this therapy is necessary. Physician Signature:        Date:       Time:     Please sign and return to [facility] or you may fax the signed copy to [phone]. Thank you.

## 2021-05-05 NOTE — PROGRESS NOTES
PHYSICAL THERAPY - DAILY TREATMENT NOTE    Patient Name: Pierre Jaimes        Date: 2017  : 1955   YES Patient  Verified  Visit #:     Insurance: Payor: Aaron Rubio / Plan: [employer] [facility] [employer] / Product Type: PPO /      In time: 630 Out time: 720   Total Treatment Time: 50     Medicare Time Tracking (below)   Total Timed Codes (min):   1:1 Treatment Time:       TREATMENT AREA =  Bilateral knee pain [M25.561, M25.562]  Postoperative pain, acute, knee [M25.569, G89.18]       SUBJECTIVE  Pain Level (on 0 to 10 scale):  0  / 10   Medication Changes/New allergies or changes in medical history, any new surgeries or procedures? NO    If yes, update Summary List   Subjective Functional Status/Changes:  []  No changes reported     No new c/o          OBJECTIVE    40 min Therapeutic Exercise:  [x]  See flow sheet   Rationale:      increase ROM, increase strength, improve coordination and improve balance to improve the patients ability to perform pain free ADLs.      10 min Manual Therapy: (B) knee PROM flexion.  Scar massage and patellar mobs. Rationale:      decrease pain, increase ROM and increase tissue extensibility to improve patient's ability to perform pain free ADLs.       min Patient Education:  YES  Reviewed HEP   []  Progressed/Changed HEP based on: Other Objective/Functional Measures:    Increase to 4# with LAQ, increase to 1# with SL hip abd and SLR.      Post Treatment Pain Level (on 0 to 10) scale:   0  / 10     ASSESSMENT  Assessment/Changes in Function:     Slight increase of weight shifting (B) while ambulating, but deomstrates good knee extension (B), good knee Flexion (B) and heel strike/toe off (B),     []  See Progress Note/Recertification   Patient will continue to benefit from skilled PT services to modify and progress therapeutic interventions, address functional mobility deficits, address ROM deficits, address strength deficits, analyze and address soft tissue restrictions and analyze and cue movement patterns to attain remaining goals. Progress toward goals / Updated goals:    Progressing steadily toward goals.      PLAN  []  Upgrade activities as tolerated YES Continue plan of care   []  Discharge due to :    []  Other:      Therapist: ERIC Tariq    Date: 9/7/2017 Time: 6:07 AM       Future Appointments  Date Time Provider Ronaldo Cabrera   9/7/2017 6:30 AM Lino Mcdaniel, [facility]   9/13/2017 5:30 PM Trudy Doing, [facility]   9/15/2017 6:30 AM Lino Mcdaniel, [facility]   9/18/2017 5:30 PM Trudy Doing, [facility]   9/20/2017 5:30 PM Trudy Doing, [facility]   9/25/2017 5:30 PM Trudy Doing, [facility]   9/27/2017 5:30 PM Trudy Doing, [facility] Stable